# Patient Record
Sex: FEMALE | Race: WHITE | NOT HISPANIC OR LATINO | Employment: OTHER | URBAN - NONMETROPOLITAN AREA
[De-identification: names, ages, dates, MRNs, and addresses within clinical notes are randomized per-mention and may not be internally consistent; named-entity substitution may affect disease eponyms.]

---

## 2017-06-21 ENCOUNTER — TELEPHONE (OUTPATIENT)
Dept: CARDIOLOGY | Facility: CLINIC | Age: 72
End: 2017-06-21

## 2017-06-21 ENCOUNTER — OFFICE VISIT (OUTPATIENT)
Dept: CARDIOLOGY | Facility: CLINIC | Age: 72
End: 2017-06-21

## 2017-06-21 VITALS
WEIGHT: 105 LBS | HEART RATE: 64 BPM | DIASTOLIC BLOOD PRESSURE: 98 MMHG | BODY MASS INDEX: 19.32 KG/M2 | HEIGHT: 62 IN | SYSTOLIC BLOOD PRESSURE: 224 MMHG

## 2017-06-21 DIAGNOSIS — E03.9 ACQUIRED HYPOTHYROIDISM: ICD-10-CM

## 2017-06-21 DIAGNOSIS — I10 ESSENTIAL HYPERTENSION: Primary | ICD-10-CM

## 2017-06-21 DIAGNOSIS — I34.0 NON-RHEUMATIC MITRAL REGURGITATION: ICD-10-CM

## 2017-06-21 DIAGNOSIS — R53.82 CHRONIC FATIGUE: ICD-10-CM

## 2017-06-21 DIAGNOSIS — R01.1 MURMUR, CARDIAC: ICD-10-CM

## 2017-06-21 PROCEDURE — 99213 OFFICE O/P EST LOW 20 MIN: CPT | Performed by: INTERNAL MEDICINE

## 2017-06-21 RX ORDER — LEVOTHYROXINE SODIUM 0.07 MG/1
75 TABLET ORAL DAILY
COMMUNITY
End: 2020-09-30 | Stop reason: DRUGHIGH

## 2017-06-21 RX ORDER — HYDROCHLOROTHIAZIDE 25 MG/1
25 TABLET ORAL DAILY
Qty: 30 TABLET | Refills: 11 | Status: SHIPPED | OUTPATIENT
Start: 2017-06-21 | End: 2018-06-27 | Stop reason: SDUPTHER

## 2017-06-21 NOTE — PROGRESS NOTES
Chief Complaint   Patient presents with   • Follow-up     BP had been doing really good but has had a sinus infection. Took a zpack and since has had a lot of stomach issues and now BP has been up again. Denies chest pain or palpitations. Asking for refills on HCTZ to [facility]. Labs per PCP recently. Didn't bring med list but went over verbally.    • Shortness of Breath     R/T allergies.    • Fatigue     Since she has taken zpack       CARDIAC COMPLAINTS  dyspnea        Subjective   Maryellen Fernando is a 72 y.o. female who came today for her follow up visit.  She has history of significant HTN, whose cardiac workup showed normal coronaries, moderate renal artery stenosis.  She is allergic to almost every medication except Tekturna.  She also takes low dose of HCTZ.  She came today, stating she has been having some sinus infection for which she has taken some Zpak.  After which her diverticulitis is acting up and her blood pressure started going tup again.  She has not been taking her diuretics regularly.              Cardiac History  Past Surgical History:   Procedure Laterality Date   • CARDIOVASCULAR STRESS TEST  02/20/2014    Stress-(Mod) 9Min, 71%THR. R/O Inferior Ischemia.   • CATH LAB PROCEDURE  03/20/2014    Cath-Normal Coronaries. (L) renal artery-60%.   • ECHO - CONVERTED  02/20/2014    Echo-EF 65%. Inferior WMA.   • HYSTERECTOMY         Current Outpatient Prescriptions   Medication Sig Dispense Refill   • aliskiren (TEKTURNA) 300 MG tablet Take 300 mg by mouth daily.     • hydrochlorothiazide (HYDRODIURIL) 25 MG tablet Take 1 tablet by mouth Daily. 30 tablet 11   • levothyroxine (SYNTHROID, LEVOTHROID) 75 MCG tablet Take 75 mcg by mouth Daily.     • LORazepam (ATIVAN) 0.5 MG tablet Take 0.5 mg by mouth As Needed for Anxiety.       No current facility-administered medications for this visit.        Allergies:  Albuterol; Avapro [irbesartan]; Beconase [beclomethasone]; Benicar [olmesartan]; Buspar  [buspirone]; Clarithromycin; Clonidine derivatives; Coreg [carvedilol]; Cozaar [losartan potassium]; Diflucan [fluconazole]; Diltiazem; Donnatal [pb-hyoscy-atropine-scopol er]; Edarbyclor [azilsartan-chlorthalidone]; Flagyl [metronidazole]; Lopid [gemfibrozil]; Minoxidil; Penicillins; Sulfa antibiotics; Toprol xl [metoprolol tartrate]; and Zestril [lisinopril]      Past Medical History:   Diagnosis Date   • Anxiety    • Esophageal reflux    • H/O: hysterectomy    • Hypercholesterolemia    • Hypertension    • Hypothyroidism    • Osteoporosis    • Scoliosis    • TB (tuberculosis)     Treated a year ago for TB       Social History     Social History   • Marital status:      Spouse name: N/A   • Number of children: N/A   • Years of education: N/A     Occupational History   • Not on file.     Social History Main Topics   • Smoking status: Former Smoker     Quit date: 12/14/1966   • Smokeless tobacco: Never Used      Comment: smoked for maybe 5 years as a teenager..    • Alcohol use No   • Drug use: No   • Sexual activity: Not on file     Other Topics Concern   • Not on file     Social History Narrative       Family History   Problem Relation Age of Onset   • Diabetes Mother    • Heart disease Mother    • Heart disease Father    • Diabetes Other      Diabetes   • Heart disease Other    • Stroke Other    • Other Other      Siblings: CABG; Cardiac stenting       Review of Systems   Constitution: Positive for decreased appetite and malaise/fatigue.   HENT: Positive for congestion and hoarse voice.    Eyes: Negative for blurred vision.   Cardiovascular: Positive for dyspnea on exertion. Negative for chest pain and irregular heartbeat.   Respiratory: Positive for shortness of breath. Negative for cough.    Endocrine: Negative for cold intolerance.   Hematologic/Lymphatic: Negative for adenopathy.   Skin: Negative for nail changes.   Musculoskeletal: Negative for arthritis.   Gastrointestinal: Positive for abdominal  "pain.   Genitourinary: Negative for bladder incontinence and incomplete emptying.   Neurological: Negative for focal weakness.   Psychiatric/Behavioral: Negative for altered mental status.   Allergic/Immunologic: Negative for environmental allergies.       Diabetes- No  Thyroid- normal    Objective     BP (!) 224/98  Pulse 64  Ht 61.5\" (156.2 cm)  Wt 105 lb (47.6 kg)  BMI 19.52 kg/m2    Physical Exam   Constitutional: She appears well-developed.   HENT:   Head: Normocephalic.   Eyes: Pupils are equal, round, and reactive to light.   Neck: Normal range of motion.   Cardiovascular: Normal rate.    Murmur heard.  Pulmonary/Chest: Effort normal.   Abdominal: Soft. Bowel sounds are normal.   Musculoskeletal: Normal range of motion.   Neurological: She is alert.   Skin: Skin is warm.   Psychiatric: She has a normal mood and affect.       Procedures          Assessment/Plan      HR is stable.  BP is significantly elevated.  I had a long talk with her about the blood pressure medications.  I advised her to take it everyday and if it continues to go up, then consider increasing the dose to 50 mg.  She is going to call us back with the blood pressure.  Overall her cardiac status appears to be stable.    Maryellen was seen today for follow-up, shortness of breath and fatigue.    Diagnoses and all orders for this visit:    Essential hypertension  -     hydrochlorothiazide (HYDRODIURIL) 25 MG tablet; Take 1 tablet by mouth Daily.    Acquired hypothyroidism    Non-rheumatic mitral regurgitation    Murmur, cardiac    Chronic fatigue                         Electronically signed by Alem Medina MD June 21, 2017 11:31 AM      "

## 2017-12-20 ENCOUNTER — OFFICE VISIT (OUTPATIENT)
Dept: CARDIOLOGY | Facility: CLINIC | Age: 72
End: 2017-12-20

## 2017-12-20 VITALS
DIASTOLIC BLOOD PRESSURE: 90 MMHG | HEIGHT: 61 IN | SYSTOLIC BLOOD PRESSURE: 180 MMHG | BODY MASS INDEX: 20.2 KG/M2 | WEIGHT: 107 LBS | HEART RATE: 64 BPM

## 2017-12-20 DIAGNOSIS — E03.9 ACQUIRED HYPOTHYROIDISM: ICD-10-CM

## 2017-12-20 DIAGNOSIS — R06.02 SHORTNESS OF BREATH: ICD-10-CM

## 2017-12-20 DIAGNOSIS — I10 ESSENTIAL HYPERTENSION: Primary | ICD-10-CM

## 2017-12-20 DIAGNOSIS — R53.82 CHRONIC FATIGUE: ICD-10-CM

## 2017-12-20 DIAGNOSIS — I34.0 NON-RHEUMATIC MITRAL REGURGITATION: ICD-10-CM

## 2017-12-20 DIAGNOSIS — R01.1 MURMUR, CARDIAC: ICD-10-CM

## 2017-12-20 PROCEDURE — 99213 OFFICE O/P EST LOW 20 MIN: CPT | Performed by: NURSE PRACTITIONER

## 2017-12-20 RX ORDER — RANITIDINE 150 MG/1
150 TABLET ORAL NIGHTLY
COMMUNITY
End: 2020-01-08 | Stop reason: ALTCHOICE

## 2017-12-20 NOTE — PROGRESS NOTES
"Chief Complaint   Patient presents with   • Follow-up     For cardiac management. She states B/P does fluctuate at times, she reports has been doing better, has increased HCTZ 25mg to 1/2 tablet and also taking Ativan 25mg 1/4 tablet daily. She states \"breathing better since starting HCTZ\". Last labs at [facility]. PCP writes refills on medication.       Cardiac Complaints  dyspnea      Subjective   Maryellen Fernando is a 72 y.o. female with significant HTN, hypothyroidism, and hyperlipidemia, whose cardiac workup showed normal coronaries and moderate renal artery stenosis.  She is allergic to almost every medication except Tekturna.  She also takes low dose of HCTZ. She returns today for follow up and denies any new concerns.  She does have shortness of breath with exertion but states this has improved with addition of HCTZ, as well as blood pressure.  She states labs are done with PCP and at [facility] in August.  She states labs except for cholesterol looked okay.  She has been intolerant to cholesterol lowering agents.  She is now only taking 1/4 of her ativan as it makes her feel tired/dizzy.  No refills are needed today as they are done with PCP.      Cardiac History  Past Surgical History:   Procedure Laterality Date   • CARDIOVASCULAR STRESS TEST  02/20/2014    Stress-(Mod) 9Min, 71%THR. R/O Inferior Ischemia.   • CATH LAB PROCEDURE  03/20/2014    Cath-Normal Coronaries. (L) renal artery-60%.   • ECHO - CONVERTED  02/20/2014    Echo-EF 65%. Inferior WMA.   • ECHO - CONVERTED  12/20/2016    EF 60%, mild to mod MR, diastolic dysfunction   • HYSTERECTOMY         Current Outpatient Prescriptions   Medication Sig Dispense Refill   • aliskiren (TEKTURNA) 300 MG tablet Take 300 mg by mouth daily.     • hydrochlorothiazide (HYDRODIURIL) 25 MG tablet Take 1 tablet by mouth Daily. (Patient taking differently: Take 12.5 mg by mouth Daily.) 30 tablet 11   • levothyroxine (SYNTHROID, LEVOTHROID) 75 MCG tablet Take 75 mcg " by mouth Daily.     • LORazepam (ATIVAN) 0.5 MG tablet 1/4 tablet daily     • raNITIdine (ZANTAC) 150 MG tablet Take 150 mg by mouth Every Night.       No current facility-administered medications for this visit.        Albuterol; Avapro [irbesartan]; Beconase [beclomethasone]; Benicar [olmesartan]; Buspar [buspirone]; Clarithromycin; Clonidine derivatives; Coreg [carvedilol]; Cozaar [losartan potassium]; Diflucan [fluconazole]; Diltiazem; Donnatal [pb-hyoscy-atropine-scopol er]; Edarbyclor [azilsartan-chlorthalidone]; Flagyl [metronidazole]; Lopid [gemfibrozil]; Minoxidil; Penicillins; Sulfa antibiotics; Toprol xl [metoprolol tartrate]; and Zestril [lisinopril]    Past Medical History:   Diagnosis Date   • Anxiety    • Esophageal reflux    • H/O: hysterectomy    • Hypercholesterolemia    • Hypertension    • Hypothyroidism    • Osteoporosis    • Scoliosis    • TB (tuberculosis)     Treated a year ago for TB       Social History     Social History   • Marital status:      Spouse name: N/A   • Number of children: N/A   • Years of education: N/A     Occupational History   • Not on file.     Social History Main Topics   • Smoking status: Former Smoker     Quit date: 12/14/1966   • Smokeless tobacco: Never Used      Comment: smoked for maybe 5 years as a teenager..    • Alcohol use No   • Drug use: No   • Sexual activity: Not on file     Other Topics Concern   • Not on file     Social History Narrative       Family History   Problem Relation Age of Onset   • Diabetes Mother    • Heart disease Mother    • Heart disease Father    • Diabetes Other      Diabetes   • Heart disease Other    • Stroke Other    • Other Other      Siblings: CABG; Cardiac stenting       Review of Systems   Constitution: Negative for weakness and malaise/fatigue.   Cardiovascular: Negative for chest pain, dyspnea on exertion, near-syncope, palpitations and syncope.   Respiratory: Positive for shortness of breath. Negative for wheezing.   "  Musculoskeletal: Negative for arthritis and back pain.   Gastrointestinal: Negative for anorexia, dysphagia and nausea.   Genitourinary: Negative for dysuria, hesitancy and nocturia.   Neurological: Negative for dizziness, headaches and light-headedness.   Psychiatric/Behavioral: Negative for altered mental status and depression.       DiabetesNo  Thyroidabnormal    Objective     /90 (BP Location: Left arm)  Pulse 64  Ht 156.2 cm (61.5\")  Wt 48.5 kg (107 lb)  BMI 19.89 kg/m2    Physical Exam   Constitutional: She is oriented to person, place, and time. She appears well-developed and well-nourished.   HENT:   Head: Normocephalic and atraumatic.   Eyes: EOM are normal. Pupils are equal, round, and reactive to light.   Neck: Normal range of motion. Neck supple.   Cardiovascular: Normal rate and regular rhythm.    Murmur heard.  Pulmonary/Chest: Effort normal and breath sounds normal.   Abdominal: Soft.   Musculoskeletal: Normal range of motion.   Neurological: She is alert and oriented to person, place, and time.   Skin: Skin is warm and dry.   Psychiatric: She has a normal mood and affect. Her behavior is normal.       Procedures    Assessment/Plan     HR is stable today.  BP is elevated at 180/90 but she reports it is from her nerves.  She states she does take ativan but has not had it today.  She does keep a log at home and states it has been okay at home.  She was advised if continues to be elevated to take an extra 1/2 tablet of her HCTZ.  No cardiac testing will be advised today as no new concerns are voiced and cardiac symptoms have improved.  Labs are done with your office, could we get next copy for our records?  No refills are needed as they are done with you.  Good cardiac diet and activity as tolerated advised.  Limited sodium intake advised.  6 month follow up encouraged or sooner if needed.      Problems Addressed this Visit        Cardiovascular and Mediastinum    Essential hypertension - " Primary    Mitral regurgitation    Murmur, cardiac       Endocrine    Acquired hypothyroidism       Other    Chronic fatigue      Other Visit Diagnoses     Shortness of breath                      Electronically signed by Roselyn Leo, EMIL December 20, 2017 5:45 PM

## 2018-06-22 NOTE — PROGRESS NOTES
"Chief Complaint   Patient presents with   • Follow-up     Cardiac management. She reports B/P doing good at home. [facility]   • Shortness of Breath     She states \"has some, due to scar tissue on lungs\".   • Palpitations     She states \"some, yet not often\".   • Med Refill     Needs refills on Hydrochlothiazide-30 day.       Cardiac Complaints  dyspnea and palpitations      Subjective   Maryellen Fernando is a 73 y.o. female with significant HTN, hypothyroidism, and hyperlipidemia, whose cardiac workup showed normal coronaries and moderate renal artery stenosis.  She is allergic to almost every medication except Tekturna.  She also takes low dose of HCTZ. At last visit, blood pressure remained elevated but she had not been taking her ativan for her nerves and reported that at home blood pressure well managed.  Patient encouraged to take an extra half of HCTZ if it remained elevated at home.  She returns today for follow up and states at home  Blood pressures have been well controlled.  She does continue to admit to shortness of breath but states no worse than prior and thinks it may be lung tissue scarring.  She also has some palpitations but admits they are rare in nature.  Refills of HCTZ needed for 30 day supply.  [facility] and were last checked in 2017 but will be done again soon.      Cardiac History  Past Surgical History:   Procedure Laterality Date   • CARDIOVASCULAR STRESS TEST  02/20/2014    Stress-(Mod) 9Min, 71%THR. R/O Inferior Ischemia.   • CATH LAB PROCEDURE  03/20/2014    Cath-Normal Coronaries. (L) renal artery-60%.   • ECHO - CONVERTED  02/20/2014    Echo-EF 65%. Inferior WMA.   • ECHO - CONVERTED  12/20/2016    EF 60%, mild to mod MR, diastolic dysfunction   • HYSTERECTOMY         Current Outpatient Prescriptions   Medication Sig Dispense Refill   • aliskiren (TEKTURNA) 300 MG tablet Take 300 mg by mouth daily.     • hydrochlorothiazide (HYDRODIURIL) 25 " MG tablet Take 0.5 tablets by mouth Daily As Needed (daily as needed for HTN). 30 tablet 11   • levothyroxine (SYNTHROID, LEVOTHROID) 75 MCG tablet Take 75 mcg by mouth Daily.     • LORazepam (ATIVAN) 0.5 MG tablet 1/4 tablet daily prn     • raNITIdine (ZANTAC) 150 MG tablet Take 150 mg by mouth Every Night.       No current facility-administered medications for this visit.        Albuterol; Avapro [irbesartan]; Beconase [beclomethasone]; Benicar [olmesartan]; Buspar [buspirone]; Clonidine derivatives; Coreg [carvedilol]; Cozaar [losartan potassium]; Diltiazem; Edarbyclor [azilsartan-chlorthalidone]; Flagyl [metronidazole]; Lopid [gemfibrozil]; Minoxidil; Sulfa antibiotics; Toprol xl [metoprolol tartrate]; Zestril [lisinopril]; Clarithromycin; Donnatal [pb-hyoscy-atropine-scopol er]; and Penicillins    Past Medical History:   Diagnosis Date   • Anxiety    • Esophageal reflux    • H/O: hysterectomy    • Hypercholesterolemia    • Hypertension    • Hypothyroidism    • Osteoporosis    • Scoliosis    • TB (tuberculosis)     Treated a year ago for TB       Social History     Social History   • Marital status:      Spouse name: N/A   • Number of children: N/A   • Years of education: N/A     Occupational History   • Not on file.     Social History Main Topics   • Smoking status: Former Smoker     Quit date: 12/14/1966   • Smokeless tobacco: Never Used      Comment: smoked for maybe 5 years as a teenager..    • Alcohol use No   • Drug use: No   • Sexual activity: Not on file     Other Topics Concern   • Not on file     Social History Narrative   • No narrative on file       Family History   Problem Relation Age of Onset   • Diabetes Mother    • Heart disease Mother    • Heart disease Father    • Diabetes Other         Diabetes   • Heart disease Other    • Stroke Other    • Other Other         Siblings: CABG; Cardiac stenting       Review of Systems   Constitution: Negative for weakness and malaise/fatigue.  "  Cardiovascular: Positive for dyspnea on exertion and palpitations. Negative for chest pain, irregular heartbeat, near-syncope, orthopnea and syncope.   Respiratory: Negative for shortness of breath and wheezing.    Musculoskeletal: Negative for back pain, joint pain and neck pain.   Gastrointestinal: Negative for anorexia, heartburn, nausea and vomiting.   Genitourinary: Negative for dysuria, hematuria, hesitancy and nocturia.   Neurological: Negative for dizziness, light-headedness and numbness.   Psychiatric/Behavioral: Negative for depression and memory loss. The patient is nervous/anxious.            Objective     /90 (BP Location: Left arm)   Pulse 64   Ht 156.2 cm (61.5\")   Wt 49.9 kg (110 lb)   BMI 20.45 kg/m²     Physical Exam   Constitutional: She is oriented to person, place, and time. She appears well-developed and well-nourished.   HENT:   Head: Normocephalic and atraumatic.   Eyes: EOM are normal. Pupils are equal, round, and reactive to light.   Neck: Normal range of motion. Neck supple.   Cardiovascular: Normal rate and regular rhythm.    Murmur heard.  Pulmonary/Chest: Effort normal and breath sounds normal.   Abdominal: Soft.   Musculoskeletal: Normal range of motion.   Neurological: She is alert and oriented to person, place, and time.   Skin: Skin is warm and dry.   Psychiatric: She has a normal mood and affect. Her behavior is normal.       Procedures    Assessment/Plan     HR is stable today.  HTN appears to be much better managed than prior, and patient reports at home it has been normal.  No adjustment to current advised. Refills of HCTZ sent per request.  No new cardiac workup will be advised at present as no new or worsening cardiac concerns are voiced.  For anxiety, she continues to take ativan on as needed basis, but admits she is managing much better than prior.  Labs she states will be done again at [facility] in August. Patient reports she will try and get records for our " review.  BMI stable at 20, continued cardiac diet with limited sodium advised.  6 month follow up scheduled or sooner if needed.      Problems Addressed this Visit        Cardiovascular and Mediastinum    Essential hypertension    Relevant Medications    hydrochlorothiazide (HYDRODIURIL) 25 MG tablet      Other Visit Diagnoses     Anxiety    -  Primary    Shortness of breath        Palpitations              Patient's Body mass index is 20.45 kg/m². BMI is within normal parameters. No follow-up required.            Electronically signed by EMIL White June 27, 2018 2:56 PM

## 2018-06-27 ENCOUNTER — OFFICE VISIT (OUTPATIENT)
Dept: CARDIOLOGY | Facility: CLINIC | Age: 73
End: 2018-06-27

## 2018-06-27 VITALS
DIASTOLIC BLOOD PRESSURE: 90 MMHG | SYSTOLIC BLOOD PRESSURE: 130 MMHG | WEIGHT: 110 LBS | HEART RATE: 64 BPM | BODY MASS INDEX: 20.77 KG/M2 | HEIGHT: 61 IN

## 2018-06-27 DIAGNOSIS — I10 ESSENTIAL HYPERTENSION: ICD-10-CM

## 2018-06-27 DIAGNOSIS — R00.2 PALPITATIONS: ICD-10-CM

## 2018-06-27 DIAGNOSIS — E03.9 ACQUIRED HYPOTHYROIDISM: ICD-10-CM

## 2018-06-27 DIAGNOSIS — R06.02 SHORTNESS OF BREATH: ICD-10-CM

## 2018-06-27 DIAGNOSIS — F41.9 ANXIETY: Primary | ICD-10-CM

## 2018-06-27 PROCEDURE — 99213 OFFICE O/P EST LOW 20 MIN: CPT | Performed by: NURSE PRACTITIONER

## 2018-06-27 RX ORDER — HYDROCHLOROTHIAZIDE 25 MG/1
12.5 TABLET ORAL DAILY PRN
Qty: 30 TABLET | Refills: 11 | Status: SHIPPED | OUTPATIENT
Start: 2018-06-27 | End: 2021-03-22 | Stop reason: SDUPTHER

## 2019-01-02 ENCOUNTER — OFFICE VISIT (OUTPATIENT)
Dept: CARDIOLOGY | Facility: CLINIC | Age: 74
End: 2019-01-02

## 2019-01-02 VITALS
WEIGHT: 108 LBS | DIASTOLIC BLOOD PRESSURE: 78 MMHG | BODY MASS INDEX: 20.39 KG/M2 | HEART RATE: 64 BPM | SYSTOLIC BLOOD PRESSURE: 142 MMHG | HEIGHT: 61 IN

## 2019-01-02 DIAGNOSIS — I34.0 NON-RHEUMATIC MITRAL REGURGITATION: ICD-10-CM

## 2019-01-02 DIAGNOSIS — I10 ESSENTIAL HYPERTENSION: Primary | ICD-10-CM

## 2019-01-02 DIAGNOSIS — E03.9 ACQUIRED HYPOTHYROIDISM: ICD-10-CM

## 2019-01-02 DIAGNOSIS — R01.1 MURMUR, CARDIAC: ICD-10-CM

## 2019-01-02 PROCEDURE — 99213 OFFICE O/P EST LOW 20 MIN: CPT | Performed by: NURSE PRACTITIONER

## 2019-01-02 NOTE — PROGRESS NOTES
Chief Complaint   Patient presents with   • Follow-up     Cardiac management. She reports B/P has been stable at home, yet she forgot her medication yesterday. Last labs at [facility]. Patient verbalized medication list.       Charles Fernando is a 73 y.o. female with significant HTN, hypothyroidism, and hyperlipidemia whose cardiac workup showed normal coronaries and moderate renal artery stenosis.  She is allergic to almost every medication except Tekturna.  She also takes low dose of HCTZ and was advised to take an extra half tablet if bp elevated. Historically she has had mild SOB and rare palpitations but no chest pain. She came today for follow up. She feels well. Denies chest pain, shortness of breath, no edema. She uses HCTZ only as needed and not often.   She has noticed ativan helps her blood pressure better than anything. 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 Labs were done at recent [facility] and monitored by Dr. Tony. Refills per Dr. Tony.    HPI         Cardiac History:    Past Surgical History:   Procedure Laterality Date   • CARDIAC CATHETERIZATION  03/20/2014    Cath-Normal Coronaries. (L) renal artery-60%.   • CARDIOVASCULAR STRESS TEST  02/20/2014    Stress-(Mod) 9Min, 71%THR. R/O Inferior Ischemia.   • ECHO - CONVERTED  02/20/2014    Echo-EF 65%. Inferior WMA.   • ECHO - CONVERTED  12/20/2016    EF 60%, mild to mod MR, diastolic dysfunction       Current Outpatient Medications   Medication Sig Dispense Refill   • aliskiren (TEKTURNA) 300 MG tablet Take 300 mg by mouth daily.     • hydrochlorothiazide (HYDRODIURIL) 25 MG tablet Take 0.5 tablets by mouth Daily As Needed (daily as needed for HTN). 30 tablet 11   • levothyroxine (SYNTHROID, LEVOTHROID) 75 MCG tablet Take 75 mcg by mouth Daily.     • LORazepam (ATIVAN) 0.5 MG tablet 1/4 tablet daily prn     • raNITIdine (ZANTAC) 150 MG tablet Take 150 mg by mouth Every Night.       No  current facility-administered medications for this visit.        Albuterol; Avapro [irbesartan]; Beconase [beclomethasone]; Benicar [olmesartan]; Buspar [buspirone]; Clonidine derivatives; Coreg [carvedilol]; Cozaar [losartan potassium]; Diltiazem; Edarbyclor [azilsartan-chlorthalidone]; Flagyl [metronidazole]; Lopid [gemfibrozil]; Minoxidil; Sulfa antibiotics; Toprol xl [metoprolol tartrate]; Zestril [lisinopril]; Clarithromycin; Donnatal [pb-hyoscy-atropine-scopol er]; and Penicillins    Past Medical History:   Diagnosis Date   • Anxiety    • Esophageal reflux    • H/O: hysterectomy    • Hypercholesterolemia    • Hypertension    • Hypothyroidism    • Osteoporosis    • Scoliosis    • TB (tuberculosis)     Treated a year ago for TB       Social History     Socioeconomic History   • Marital status:      Spouse name: Not on file   • Number of children: Not on file   • Years of education: Not on file   • Highest education level: Not on file   Social Needs   • Financial resource strain: Not on file   • Food insecurity - worry: Not on file   • Food insecurity - inability: Not on file   • Transportation needs - medical: Not on file   • Transportation needs - non-medical: Not on file   Occupational History   • Not on file   Tobacco Use   • Smoking status: Former Smoker     Last attempt to quit: 1966     Years since quittin.0   • Smokeless tobacco: Never Used   • Tobacco comment: smoked for maybe 5 years as a teenager..    Substance and Sexual Activity   • Alcohol use: No   • Drug use: No   • Sexual activity: Not on file   Other Topics Concern   • Not on file   Social History Narrative   • Not on file       Family History   Problem Relation Age of Onset   • Diabetes Mother    • Heart disease Mother    • Heart disease Father    • Diabetes Other         Diabetes   • Heart disease Other    • Stroke Other    • Other Other         Siblings: CABG; Cardiac stenting       Review of Systems   Constitution: Positive  "for weight loss (down 2 lb ). Negative for decreased appetite, weakness and malaise/fatigue.   HENT: Negative.    Eyes: Negative.    Cardiovascular: Positive for palpitations (rarely). Negative for chest pain, dyspnea on exertion, leg swelling, orthopnea and syncope.   Respiratory: Negative for cough and shortness of breath.    Endocrine: Negative.    Hematologic/Lymphatic: Negative.    Skin: Negative.    Musculoskeletal: Negative for arthritis and myalgias.   Gastrointestinal: Negative for abdominal pain and melena.   Genitourinary: Negative for dysuria and hematuria.   Neurological: Negative for dizziness.   Psychiatric/Behavioral: Negative for altered mental status and depression.   Allergic/Immunologic: Negative.         Diabetes- No  Thyroid-normal    Objective     /78 (BP Location: Left arm)   Pulse 64   Ht 156.2 cm (61.5\")   Wt 49 kg (108 lb)   BMI 20.08 kg/m²     Physical Exam   Constitutional: She is oriented to person, place, and time. She appears well-developed and well-nourished. No distress.   HENT:   Head: Normocephalic and atraumatic.   Eyes: Pupils are equal, round, and reactive to light.   Neck: Normal range of motion.   Cardiovascular: Normal rate, regular rhythm and intact distal pulses.   Murmur heard.   Systolic murmur is present with a grade of 2/6 at the apex.  Pulmonary/Chest: Effort normal and breath sounds normal. No respiratory distress. She has no wheezes. She has no rales.   Abdominal: Soft. Bowel sounds are normal. She exhibits no distension. There is no tenderness.   Musculoskeletal: Normal range of motion. She exhibits no edema.   Neurological: She is alert and oriented to person, place, and time.   Skin: Skin is warm and dry. She is not diaphoretic.   Psychiatric: She has a normal mood and affect.   Nursing note and vitals reviewed.     Procedures          Assessment/Plan    Heart rate stable. Blood pressure upper limit of normal. She did forget her medicine yesterday. No " changes made today. Continue Tekturna. She uses HCTZ as needed. She has no symptoms, therefore no testing ordered today. She is advised to limit sodium to 1500 mg daily. She will remain active with regular walking. She works four hours daily and was encouraged to continue as long as she tolerates. Labs are followed by Dr. Tony and checked annually at the [facility]. Refills done by Dr. Tony as well. She appears stable. We will see her back in six months or sooner if needed.     Maryellen was seen today for follow-up.    Diagnoses and all orders for this visit:    Essential hypertension    Non-rheumatic mitral regurgitation    Murmur, cardiac    Acquired hypothyroidism        Patient's Body mass index is 20.08 kg/m². BMI is above normal parameters. Recommendations include: nutrition counseling.                    Electronically signed by EMIL Granados,  January 2, 2019 10:15 AM

## 2019-07-03 ENCOUNTER — OFFICE VISIT (OUTPATIENT)
Dept: CARDIOLOGY | Facility: CLINIC | Age: 74
End: 2019-07-03

## 2019-07-03 VITALS
BODY MASS INDEX: 20.01 KG/M2 | WEIGHT: 106 LBS | HEART RATE: 60 BPM | HEIGHT: 61 IN | DIASTOLIC BLOOD PRESSURE: 90 MMHG | SYSTOLIC BLOOD PRESSURE: 140 MMHG

## 2019-07-03 DIAGNOSIS — I34.0 NON-RHEUMATIC MITRAL REGURGITATION: ICD-10-CM

## 2019-07-03 DIAGNOSIS — R06.02 SHORTNESS OF BREATH: ICD-10-CM

## 2019-07-03 DIAGNOSIS — I10 ESSENTIAL HYPERTENSION: ICD-10-CM

## 2019-07-03 DIAGNOSIS — E03.9 ACQUIRED HYPOTHYROIDISM: ICD-10-CM

## 2019-07-03 DIAGNOSIS — R01.1 HEART MURMUR: Primary | ICD-10-CM

## 2019-07-03 DIAGNOSIS — F41.9 ANXIETY: ICD-10-CM

## 2019-07-03 PROCEDURE — 99213 OFFICE O/P EST LOW 20 MIN: CPT | Performed by: NURSE PRACTITIONER

## 2019-07-09 PROCEDURE — 93306 TTE W/DOPPLER COMPLETE: CPT | Performed by: INTERNAL MEDICINE

## 2019-07-15 ENCOUNTER — OUTSIDE FACILITY SERVICE (OUTPATIENT)
Dept: CARDIOLOGY | Facility: CLINIC | Age: 74
End: 2019-07-15

## 2020-01-08 ENCOUNTER — OFFICE VISIT (OUTPATIENT)
Dept: CARDIOLOGY | Facility: CLINIC | Age: 75
End: 2020-01-08

## 2020-01-08 VITALS
BODY MASS INDEX: 19.94 KG/M2 | SYSTOLIC BLOOD PRESSURE: 160 MMHG | HEIGHT: 61 IN | DIASTOLIC BLOOD PRESSURE: 90 MMHG | WEIGHT: 105.6 LBS | HEART RATE: 60 BPM

## 2020-01-08 DIAGNOSIS — R01.1 MURMUR, CARDIAC: ICD-10-CM

## 2020-01-08 DIAGNOSIS — I10 ESSENTIAL HYPERTENSION: Primary | ICD-10-CM

## 2020-01-08 DIAGNOSIS — I34.0 NONRHEUMATIC MITRAL VALVE REGURGITATION: ICD-10-CM

## 2020-01-08 DIAGNOSIS — I27.20 PULMONARY HYPERTENSION (HCC): ICD-10-CM

## 2020-01-08 PROCEDURE — 99213 OFFICE O/P EST LOW 20 MIN: CPT | Performed by: NURSE PRACTITIONER

## 2020-01-08 NOTE — PROGRESS NOTES
Chief Complaint   Patient presents with   • Follow-up     Cardiac management.   • Lab     Last labs [facility] in August. PCP writes refills on medication.   • Shortness of Breath     Has noticed more since last visit with exertion. She reports having problems with lungs in past.    • Rapid Heart Rate     Only had while taking Prednisone, has had no further problems since stopping Prednisone.   • Blood pressure     Has been elevated with sinus congestion and with anxiety.       Charles Fernando is a 74 y.o. female with significant HTN, hypothyroidism, anxiety, mitral regurgitation and hyperlipidemia whose cardiac workup showed normal coronaries and moderate renal artery stenosis. Most recent echo in 2016 showed normal LV function and mod MR.  She is allergic to almost every medication except Tekturna.  She also takes low dose of HCTZ as needed for elevated bp. She is quite anxious and patient reports ativan brings her blood pressure down as well. Echo repeated July 2019 which showed normal EF and MR and AI remained mild. PA pressure mildly elevated. She came today for follow up. She denies cardiac symptoms. No CP, SOB, palpitations. She monitors bp at home which is mostly well controlled with occasional elevated reading. Labs followed by Dr. Tony. She has been unable to tolerate any statin.    HPI         Cardiac History:    Past Surgical History:   Procedure Laterality Date   • CARDIAC CATHETERIZATION  03/20/2014    Cath-Normal Coronaries. (L) renal artery-60%.   • CARDIOVASCULAR STRESS TEST  02/20/2014    Stress-(Mod) 9Min, 71%THR. R/O Inferior Ischemia.   • ECHO - CONVERTED  02/20/2014    Echo-EF 65%. Inferior WMA.   • ECHO - CONVERTED  12/20/2016    EF 60%, mild to mod MR, diastolic dysfunction   • ECHO - CONVERTED  07/15/2019    @ [facility]. EF 65%. Mild MR & AI. RVSP- 35 mmHg     Current Outpatient Medications   Medication Sig Dispense Refill   • aliskiren (TEKTURNA) 300 MG tablet Take 300 mg by  mouth daily.     • hydrochlorothiazide (HYDRODIURIL) 25 MG tablet Take 0.5 tablets by mouth Daily As Needed (daily as needed for HTN). 30 tablet 11   • levothyroxine (SYNTHROID, LEVOTHROID) 75 MCG tablet Take 75 mcg by mouth Daily.     • LORazepam (ATIVAN) 0.5 MG tablet 1/4 tablet daily prn       No current facility-administered medications for this visit.      Albuterol; Avapro [irbesartan]; Beconase [beclomethasone]; Benicar [olmesartan]; Buspar [buspirone]; Clonidine derivatives; Coreg [carvedilol]; Cozaar [losartan potassium]; Diltiazem; Edarbyclor [azilsartan-chlorthalidone]; Flagyl [metronidazole]; Lopid [gemfibrozil]; Minoxidil; Sulfa antibiotics; Toprol xl [metoprolol tartrate]; Zestril [lisinopril]; Clarithromycin; Donnatal [pb-hyoscy-atropine-scopolamine]; and Penicillins    Past Medical History:   Diagnosis Date   • Anxiety    • Esophageal reflux    • H/O: hysterectomy    • Hypercholesterolemia    • Hypertension    • Hypothyroidism    • Osteoporosis    • Scoliosis    • TB (tuberculosis)     Treated a year ago for TB     Social History     Socioeconomic History   • Marital status:      Spouse name: Not on file   • Number of children: Not on file   • Years of education: Not on file   • Highest education level: Not on file   Tobacco Use   • Smoking status: Former Smoker     Last attempt to quit: 1966     Years since quittin.1   • Smokeless tobacco: Never Used   • Tobacco comment: smoked for maybe 5 years as a teenager..    Substance and Sexual Activity   • Alcohol use: No   • Drug use: No     Family History   Problem Relation Age of Onset   • Diabetes Mother    • Heart disease Mother    • Heart disease Father    • Diabetes Other         Diabetes   • Heart disease Other    • Stroke Other    • Other Other         Siblings: CABG; Cardiac stenting     Review of Systems   Constitution: Negative for decreased appetite, malaise/fatigue and weight loss.   HENT: Negative.    Eyes: Negative.   "  Cardiovascular: Negative for chest pain, dyspnea on exertion, leg swelling, palpitations and syncope.   Respiratory: Negative for cough and shortness of breath.    Endocrine: Negative.    Hematologic/Lymphatic: Negative.    Skin: Negative.    Musculoskeletal: Negative for falls and myalgias.   Gastrointestinal: Negative for abdominal pain and melena.   Genitourinary: Negative for hematuria.   Neurological: Negative for dizziness.   Psychiatric/Behavioral: Negative for altered mental status. The patient is nervous/anxious.    Allergic/Immunologic: Negative.       Diabetes- No  Thyroid-normal    Objective     /90 (BP Location: Left arm)   Pulse 60   Ht 156.2 cm (61.5\")   Wt 47.9 kg (105 lb 9.6 oz)   BMI 19.63 kg/m²     Physical Exam   Constitutional: She is oriented to person, place, and time. She appears well-developed and well-nourished.   HENT:   Head: Normocephalic.   Eyes: Pupils are equal, round, and reactive to light.   Neck: Normal range of motion.   Cardiovascular: Normal rate, regular rhythm and intact distal pulses.   Murmur heard.   Systolic murmur is present with a grade of 2/6 at the upper right sternal border and apex.  Pulmonary/Chest: Effort normal and breath sounds normal. No respiratory distress.   Abdominal: Soft. Bowel sounds are normal.   Musculoskeletal: Normal range of motion. She exhibits no edema.   Neurological: She is alert and oriented to person, place, and time.   Skin: Skin is warm and dry.   Psychiatric: She has a normal mood and affect.   Nursing note and vitals reviewed.     Procedures          Assessment/Plan    Maryellen was seen today for follow-up, lab, shortness of breath, rapid heart rate and blood pressure.    Diagnoses and all orders for this visit:    Essential hypertension    Nonrheumatic mitral valve regurgitation    Murmur, cardiac    Pulmonary hypertension (CMS/HCC)    1. HTN- essential vs renovascular- elevated today. Continue Tekturna 300 mg qd. She is advised " to take HCTZ daily at least for the next one week and monitor blood pressure. Limit sodium intake. Report if not improved. Med allergy list reviewed. I do not see amlodipine as allergy which may be an option of the blood pressure does not improve.     2. VHD- stable, Echo 7/2019- EF 65%, mild MR, mild AI, RVSP 35 mmHg. Continue to monitor. Need improved HTN control.    3. Hyperlipidemia- managed with diet as she is unable to tolerate statin therapy.     Cardiac reports reviewed with her. She denies any new symptoms. No testing ordered today. If she develops any chest tightness/discomfort, we may need to repeat her stress test. We will see her back in six months or sooner if needed.     Patient's Body mass index is 19.63 kg/m². BMI is within normal parameters. No follow-up required..               Electronically signed by MEIL Granados,  January 12, 2020 3:56 PM

## 2020-01-12 PROBLEM — I27.20 PULMONARY HYPERTENSION (HCC): Status: ACTIVE | Noted: 2020-01-12

## 2020-09-30 ENCOUNTER — OFFICE VISIT (OUTPATIENT)
Dept: CARDIOLOGY | Facility: CLINIC | Age: 75
End: 2020-09-30

## 2020-09-30 VITALS
HEIGHT: 61 IN | RESPIRATION RATE: 16 BRPM | HEART RATE: 52 BPM | DIASTOLIC BLOOD PRESSURE: 92 MMHG | TEMPERATURE: 98.4 F | WEIGHT: 106.6 LBS | SYSTOLIC BLOOD PRESSURE: 228 MMHG | BODY MASS INDEX: 20.12 KG/M2

## 2020-09-30 DIAGNOSIS — R00.1 BRADYCARDIA, SINUS: Primary | ICD-10-CM

## 2020-09-30 DIAGNOSIS — R01.1 MURMUR, CARDIAC: ICD-10-CM

## 2020-09-30 DIAGNOSIS — I10 ESSENTIAL HYPERTENSION: ICD-10-CM

## 2020-09-30 DIAGNOSIS — F41.9 ANXIETY: ICD-10-CM

## 2020-09-30 PROCEDURE — 99213 OFFICE O/P EST LOW 20 MIN: CPT | Performed by: NURSE PRACTITIONER

## 2020-09-30 PROCEDURE — 93000 ELECTROCARDIOGRAM COMPLETE: CPT | Performed by: NURSE PRACTITIONER

## 2020-09-30 RX ORDER — FAMOTIDINE 10 MG
10 TABLET ORAL 2 TIMES DAILY
COMMUNITY
End: 2021-03-22

## 2020-09-30 RX ORDER — LEVOTHYROXINE SODIUM 0.05 MG/1
50 TABLET ORAL DAILY
COMMUNITY
Start: 2020-09-16 | End: 2021-03-22

## 2020-09-30 RX ORDER — AZITHROMYCIN 250 MG/1
TABLET, FILM COATED ORAL
COMMUNITY
Start: 2020-09-25 | End: 2021-03-22

## 2020-09-30 RX ORDER — CLONIDINE HYDROCHLORIDE 0.1 MG/1
TABLET ORAL
Qty: 30 TABLET | Refills: 8 | Status: SHIPPED | OUTPATIENT
Start: 2020-09-30 | End: 2021-03-22 | Stop reason: SDUPTHER

## 2020-09-30 RX ORDER — AMLODIPINE BESYLATE 5 MG/1
5 TABLET ORAL DAILY
Qty: 30 TABLET | Refills: 11 | Status: SHIPPED | OUTPATIENT
Start: 2020-09-30 | End: 2021-03-22

## 2020-09-30 NOTE — PROGRESS NOTES
Chief Complaint   Patient presents with   • Follow-up     Cardiac Management   • Med Management     No refills. PCP provides refills   • Labs     Completed 8/2020 @ PCP's office. Will call and request.   • Hypertension     BP readings provided are before BP meds. Denies HA's, CP, chest pressure, flushed sensation, chnages in vision. Pt reports BP was elevated at PCP's office. BP meds taken at 0830 am today.       Cardiac Complaints  none      Subjective   Maryellen Fernando is a 75 y.o. female with significant HTN, hypothyroidism, anxiety, mitral regurgitation, and hyperlipidemia whose cardiac workup showed normal coronaries and moderate renal artery stenosis. Most recent echo in 2016 showed normal LV function and mod MR.  She is allergic to almost every medication except Tekturna.  She also takes low dose of HCTZ as needed for elevated bp. She is quite anxious and patient reports ativan brings her blood pressure down as well. Echo repeated July 2019 which showed normal EF and MR and AI remained mild. PA pressure mildly elevated.     Patient returns today for follow up and denies any new concerns. Chest pain, SOA, palpitations, dizziness, and syncope denied. TIA denied. Labs she admits are followed by PCP and were last checked August 2020, no current available. She does bring blood pressure log today, but all are before medication therapy.  She admits to very high readings at home and states often above 250 SBP, she denies taking her ativan for her nerves as she is nervous to do so.  No refills needed.        Cardiac History  Past Surgical History:   Procedure Laterality Date   • CARDIAC CATHETERIZATION  03/20/2014    Cath-Normal Coronaries. (L) renal artery-60%.   • CARDIOVASCULAR STRESS TEST  02/20/2014    Stress-(Mod) 9Min, 71%THR. R/O Inferior Ischemia.   • ECHO - CONVERTED  02/20/2014    Echo-EF 65%. Inferior WMA.   • ECHO - CONVERTED  12/20/2016    EF 60%, mild to mod MR, diastolic dysfunction   • ECHO - CONVERTED   07/15/2019    @ [facility].  65%. Mild MR & AI. RVSP- 35 mmHg   • HYSTERECTOMY         Current Outpatient Medications   Medication Sig Dispense Refill   • aliskiren (TEKTURNA) 300 MG tablet Take 300 mg by mouth daily.     • azithromycin (ZITHROMAX) 250 MG tablet TAKE 2 TABLETS BY MOUTH ON DAY 1, THEN TAKE 1 TABLET DAILY ON DAYS 2-5     • Desloratadine (CLARINEX PO) Take  by mouth Daily As Needed.     • famotidine (PEPCID) 10 MG tablet Take 10 mg by mouth 2 (Two) Times a Day.     • hydrochlorothiazide (HYDRODIURIL) 25 MG tablet Take 0.5 tablets by mouth Daily As Needed (daily as needed for HTN). 30 tablet 11   • levothyroxine (SYNTHROID, LEVOTHROID) 50 MCG tablet Take 50 mcg by mouth Daily.     • LORazepam (ATIVAN) 0.5 MG tablet 1/4 tablet daily prn     • amLODIPine (NORVASC) 5 MG tablet Take 1 tablet by mouth Daily. 30 tablet 11   • cloNIDine (Catapres) 0.1 MG tablet Please take if BP high after taking HCTZ, tekturna, and amlodipine 30 tablet 8     No current facility-administered medications for this visit.        Albuterol, Edarbyclor [azilsartan-chlorthalidone], Flagyl [metronidazole], Lopid [gemfibrozil], Minoxidil, Sulfa antibiotics, Avapro [irbesartan], Beconase [beclomethasone], Benicar [olmesartan], Buspar [buspirone], Clarithromycin, Clonidine derivatives, Coreg [carvedilol], Cozaar [losartan potassium], Diltiazem, Donnatal [pb-hyoscy-atropine-scopolamine], Penicillins, Toprol xl [metoprolol tartrate], and Zestril [lisinopril]    Past Medical History:   Diagnosis Date   • Anxiety    • Anxiety    • Esophageal reflux    • H/O: hysterectomy    • Hypercholesterolemia    • Hypertension    • Hypothyroidism    • Osteoporosis    • Scoliosis    • TB (tuberculosis)     Treated a year ago for TB       Social History     Socioeconomic History   • Marital status:      Spouse name: Not on file   • Number of children: Not on file   • Years of education: Not on file   • Highest education level: Not on file   Tobacco Use  "  • Smoking status: Former Smoker     Quit date: 1966     Years since quittin.8   • Smokeless tobacco: Never Used   • Tobacco comment: smoked for maybe 5 years as a teenager..    Substance and Sexual Activity   • Alcohol use: No   • Drug use: No       Family History   Problem Relation Age of Onset   • Diabetes Mother    • Heart disease Mother    • Heart disease Father    • Diabetes Other         Diabetes   • Heart disease Other    • Stroke Other    • Other Other         Siblings: CABG; Cardiac stenting       Review of Systems   Constitution: Negative for malaise/fatigue and night sweats.   Cardiovascular: Negative for chest pain, claudication, dyspnea on exertion, irregular heartbeat, leg swelling, orthopnea, palpitations and syncope.   Respiratory: Negative for cough, shortness of breath and wheezing.    Musculoskeletal: Positive for stiffness. Negative for back pain and joint pain.   Gastrointestinal: Negative for anorexia, nausea and vomiting.   Genitourinary: Negative for dysuria, hematuria, hesitancy and nocturia.   Neurological: Negative for dizziness, light-headedness and loss of balance.   Psychiatric/Behavioral: Negative for depression. The patient is nervous/anxious.            Objective     BP (!) 228/92 (BP Location: Left arm, Patient Position: Sitting, Cuff Size: Adult)   Pulse 52   Temp 98.4 °F (36.9 °C) (Temporal)   Resp 16   Ht 156.2 cm (61.5\")   Wt 48.4 kg (106 lb 9.6 oz)   Breastfeeding No   BMI 19.82 kg/m²     Constitutional:       Appearance: Healthy appearance.   Eyes:      Pupils: Pupils are equal, round, and reactive to light.   HENT:    Mouth/Throat:      Pharynx: Oropharynx is clear.   Neck:      Musculoskeletal: Normal range of motion and neck supple.   Pulmonary:      Effort: Pulmonary effort is normal.   Cardiovascular:      PMI at left midclavicular line. Bradycardia present. Regular rhythm.      Murmurs: There is a high frequency blowing holosystolic murmur at the " apex.   Abdominal:      Palpations: Abdomen is soft.   Musculoskeletal: Normal range of motion.   Skin:     General: Skin is warm and dry.   Neurological:      Mental Status: Oriented to person, place and time.           ECG 12 Lead    Date/Time: 9/30/2020 12:33 PM  Performed by: Roselyn Leo APRN  Authorized by: Roselyn Leo APRN   Comparison: compared with previous ECG   Rhythm: sinus bradycardia  BPM: 50    Clinical impression: normal ECG  Comments: Normal QT            Assessment/Plan     HTN:  Long discussion about management. She will be urged to begin norvasc therapy.  Patient extremely hesitant due to side effects. She is encouraged to begin with 1/2 tablet and increase if not controlled. She will continue with tekturna and HCTZ.  Clonidine added for prn use if SBP greater than 160 after meds.     Bradycardia:  Remains asymptomatic in regards. Pulse is slow but has been for years.    Anxiety:  She is NOT taking her ativan and we discussed once again taking medication for management since not taking is most likely driving up BP.      Hypothyroid:  Managed with synthroid therapy.  TSH with your office. Can we have for review?      Cardiac status:  Stable, no new concerns voiced. No workup advised. EKG done today for HTN and bradycardia shows sinus alberto with normal QT.     BMI stable at 19.82, continued cardiac diet with adequate protein and limited sodium advised.    No refills needed.    6 month follow up recommended or sooner if needed.             Problems Addressed this Visit        Cardiovascular and Mediastinum    Essential hypertension    Relevant Medications    amLODIPine (NORVASC) 5 MG tablet    cloNIDine (Catapres) 0.1 MG tablet    Murmur, cardiac      Other Visit Diagnoses     Bradycardia, sinus    -  Primary    Relevant Medications    amLODIPine (NORVASC) 5 MG tablet    Other Relevant Orders    ECG 12 Lead    Anxiety          Diagnoses       Codes Comments    Bradycardia, sinus    -   Primary ICD-10-CM: R00.1  ICD-9-CM: 427.89     Essential hypertension     ICD-10-CM: I10  ICD-9-CM: 401.9     Murmur, cardiac     ICD-10-CM: R01.1  ICD-9-CM: 785.2     Anxiety     ICD-10-CM: F41.9  ICD-9-CM: 300.00           Patient's Body mass index is 19.82 kg/m². BMI is within normal parameters. No follow-up required..            Electronically signed by Roselyn Leo, EMIL September 30, 2020 16:52 EDT

## 2021-03-22 ENCOUNTER — OFFICE VISIT (OUTPATIENT)
Dept: CARDIOLOGY | Facility: CLINIC | Age: 76
End: 2021-03-22

## 2021-03-22 VITALS
DIASTOLIC BLOOD PRESSURE: 84 MMHG | WEIGHT: 103 LBS | HEART RATE: 68 BPM | TEMPERATURE: 96.9 F | SYSTOLIC BLOOD PRESSURE: 162 MMHG | HEIGHT: 61 IN | BODY MASS INDEX: 19.45 KG/M2

## 2021-03-22 DIAGNOSIS — I10 ESSENTIAL HYPERTENSION: ICD-10-CM

## 2021-03-22 DIAGNOSIS — E03.9 ACQUIRED HYPOTHYROIDISM: ICD-10-CM

## 2021-03-22 DIAGNOSIS — F41.9 ANXIETY: ICD-10-CM

## 2021-03-22 DIAGNOSIS — R53.83 OTHER FATIGUE: ICD-10-CM

## 2021-03-22 DIAGNOSIS — R00.2 PALPITATIONS: ICD-10-CM

## 2021-03-22 DIAGNOSIS — R01.1 MURMUR, CARDIAC: Primary | ICD-10-CM

## 2021-03-22 DIAGNOSIS — I27.20 PULMONARY HYPERTENSION (HCC): ICD-10-CM

## 2021-03-22 PROCEDURE — 99214 OFFICE O/P EST MOD 30 MIN: CPT | Performed by: NURSE PRACTITIONER

## 2021-03-22 RX ORDER — FAMOTIDINE 20 MG/1
20 TABLET, FILM COATED ORAL 2 TIMES DAILY
COMMUNITY
End: 2022-03-10

## 2021-03-22 RX ORDER — CLONIDINE HYDROCHLORIDE 0.1 MG/1
TABLET ORAL
Qty: 90 TABLET | Refills: 2 | Status: SHIPPED | OUTPATIENT
Start: 2021-03-22 | End: 2022-03-10 | Stop reason: HOSPADM

## 2021-03-22 RX ORDER — LEVOTHYROXINE SODIUM 0.07 MG/1
75 TABLET ORAL DAILY
COMMUNITY

## 2021-03-22 RX ORDER — HYDROCHLOROTHIAZIDE 25 MG/1
12.5 TABLET ORAL DAILY PRN
Qty: 90 TABLET | Refills: 2 | Status: SHIPPED | OUTPATIENT
Start: 2021-03-22 | End: 2022-12-08 | Stop reason: SDUPTHER

## 2021-03-22 RX ORDER — ALISKIREN 300 MG/1
300 TABLET, FILM COATED ORAL DAILY
Qty: 90 TABLET | Refills: 2 | Status: SHIPPED | OUTPATIENT
Start: 2021-03-22 | End: 2022-12-08 | Stop reason: SDUPTHER

## 2021-03-22 NOTE — PROGRESS NOTES
Chief Complaint   Patient presents with   • Follow-up     Cardiac management   • Lab     Last labs 3/2021 at [facility].   • Palpitations     Few episodes fluttering at the first of month, had epigastric pain went to [facility] ER. Had CT of Abd/pelvis, labs and EKG, attempting to obtain records.   • Fatigue     Has noticed more since having Covid 01/2021. She had loss weight to 98lb 2/2021.   • Norvasc     Stopped taking after 2 weeks due to ankles changing color and burning in ankles.    • Med Refill     Needs refills on cardiac medications-90 day       Cardiac Complaints  fatigue and palpitations      Subjective   Maryellen Fernando is a 76 y.o. female  with significant HTN, hypothyroidism, anxiety, mitral regurgitation, and hyperlipidemia whose cardiac workup showed normal coronaries and moderate renal artery stenosis. Most recent echo in 2016 showed normal LV function and mod MR.  She is allergic to almost every medication except Tekturna.  She also takes low dose of HCTZ as needed for elevated bp. She is quite anxious and patient reports ativan brings her blood pressure down as well. Echo repeated July 2019 which showed normal EF and MR and AI remained mild. PA pressure mildly elevated.      Patient returns today for follow up and denies any new concerns. Chest pain, SOA, palpitations, dizziness, and syncope denied. TIA denied.  At last visit, blood pressure was markedly increased, and she was advised to add back norvasc therapy. She had also reported holding her ativan as she was fearful to take it, but she was encouraged to take it as needed. She does report having some issues with her stomach hurting, and went to [facility] ER on 3/2021, we will obtain records, none available for review. She does admit to COVID in January 2021, and did lose weight at that time, she states it is coming back up.  She does report she is not taking norvasc as it made her legs hurt, but admits she has been back on her ativan at 1/2 tablet daily.  Refills  requested.  Labs done recently done at [facility].         Cardiac History  Past Surgical History:   Procedure Laterality Date   • CARDIAC CATHETERIZATION  03/20/2014    Cath-Normal Coronaries. (L) renal artery-60%.   • CARDIOVASCULAR STRESS TEST  02/20/2014    Stress-(Mod) 9Min, 71%THR. R/O Inferior Ischemia.   • ECHO - CONVERTED  02/20/2014    Echo-EF 65%. Inferior WMA.   • ECHO - CONVERTED  12/20/2016    EF 60%, mild to mod MR, diastolic dysfunction   • ECHO - CONVERTED  07/15/2019    @ [facility]. EF 65%. Mild MR & AI. RVSP- 35 mmHg   • HYSTERECTOMY         Current Outpatient Medications   Medication Sig Dispense Refill   • aliskiren (TEKTURNA) 300 MG tablet Take 1 tablet by mouth Daily. 90 tablet 2   • cloNIDine (Catapres) 0.1 MG tablet Please take if BP high after taking HCTZ, tekturna, and amlodipine 90 tablet 2   • Desloratadine (CLARINEX PO) Take  by mouth Daily As Needed.     • famotidine (PEPCID) 20 MG tablet Take 20 mg by mouth 2 (Two) Times a Day.     • hydroCHLOROthiazide (HYDRODIURIL) 25 MG tablet Take 0.5 tablets by mouth Daily As Needed (daily as needed for HTN). 90 tablet 2   • levothyroxine (SYNTHROID, LEVOTHROID) 75 MCG tablet Take 75 mcg by mouth Daily.     • LORazepam (ATIVAN) 0.5 MG tablet 1/2 tablet daily prn       No current facility-administered medications for this visit.       Albuterol, Edarbyclor [azilsartan-chlorthalidone], Flagyl [metronidazole], Lopid [gemfibrozil], Minoxidil, Sulfa antibiotics, Avapro [irbesartan], Beconase [beclomethasone], Benicar [olmesartan], Buspar [buspirone], Clarithromycin, Clonidine derivatives, Coreg [carvedilol], Cozaar [losartan potassium], Diltiazem, Donnatal [phenobarbital-belladonna alk], Penicillins, Toprol xl [metoprolol tartrate], and Zestril [lisinopril]    Past Medical History:   Diagnosis Date   • Anxiety    • Anxiety    • Esophageal reflux    • H/O: hysterectomy    • Hypercholesterolemia    • Hypertension    • Hypothyroidism    • Osteoporosis    • Scoliosis   "  • TB (tuberculosis)     Treated a year ago for TB       Social History     Socioeconomic History   • Marital status:      Spouse name: Not on file   • Number of children: Not on file   • Years of education: Not on file   • Highest education level: Not on file   Tobacco Use   • Smoking status: Former Smoker     Quit date: 1966     Years since quittin.3   • Smokeless tobacco: Never Used   • Tobacco comment: smoked for maybe 5 years as a teenager..    Vaping Use   • Vaping Use: Never used   Substance and Sexual Activity   • Alcohol use: No   • Drug use: No       Family History   Problem Relation Age of Onset   • Diabetes Mother    • Heart disease Mother    • Heart disease Father    • Diabetes Other         Diabetes   • Heart disease Other    • Stroke Other    • Other Other         Siblings: CABG; Cardiac stenting       Review of Systems   Constitutional: Positive for malaise/fatigue. Negative for night sweats.   Cardiovascular: Positive for palpitations. Negative for chest pain, claudication, dyspnea on exertion, irregular heartbeat, leg swelling, near-syncope, orthopnea and syncope.   Respiratory: Negative for cough, shortness of breath and wheezing.    Musculoskeletal: Positive for stiffness. Negative for back pain and joint pain.   Gastrointestinal: Negative for anorexia, heartburn, melena, nausea and vomiting.   Genitourinary: Negative for dysuria, hematuria, hesitancy and nocturia.   Neurological: Negative for dizziness, light-headedness and loss of balance.   Psychiatric/Behavioral: Negative for depression and memory loss. The patient is nervous/anxious.            Objective     /84 (BP Location: Left arm)   Pulse 68   Temp 96.9 °F (36.1 °C)   Ht 156.2 cm (61.5\")   Wt 46.7 kg (103 lb)   BMI 19.15 kg/m²     Constitutional:       Appearance: Healthy appearance. Not in distress.   Eyes:      Pupils: Pupils are equal, round, and reactive to light.   HENT:      Nose: Nose normal. "   Pulmonary:      Effort: Pulmonary effort is normal.      Breath sounds: Normal breath sounds.   Cardiovascular:      PMI at left midclavicular line. Normal rate. Regular rhythm.      Murmurs: There is a systolic murmur.   Abdominal:      Palpations: Abdomen is soft.   Musculoskeletal: Normal range of motion.      Cervical back: Normal range of motion and neck supple. Skin:     General: Skin is warm and dry.   Neurological:      Mental Status: Oriented to person, place and time.         Procedures    Assessment/Plan     HTN:  Blood pressure elevated but she states at home it is well controlled. She does not wish to add medication in regards.  She will be taking her ativan and states this has been beneficial. She was urged to increase to 1/2 tablet BID, instead of 1/2 tablet daily.  BP log encouraged.     Anxiety:  Now back on 1/2 tablet per day of ativan and tolerating well.     Hypothyroid:  Managed with synthroid therapy.  TSH with your office. Can we have for review?       Cardiac status:  Stable, no new concerns voiced. No workup advised.     Stomach Pain:  Reported but no worse than prior. Recently in ER, trying to obtain records?      BMI stable at 19.15, continued cardiac diet with adequate protein and limited sodium advised.  Protein supplementation urged.     No refills needed.     6 month follow up recommended or sooner if needed.          Problems Addressed this Visit        Cardiac and Vasculature    Essential hypertension    Relevant Medications    aliskiren (TEKTURNA) 300 MG tablet    cloNIDine (Catapres) 0.1 MG tablet    hydroCHLOROthiazide (HYDRODIURIL) 25 MG tablet    Murmur, cardiac - Primary       Endocrine and Metabolic    Acquired hypothyroidism    Relevant Medications    levothyroxine (SYNTHROID, LEVOTHROID) 75 MCG tablet       Pulmonary and Pneumonias    Pulmonary hypertension (CMS/HCC)      Other Visit Diagnoses     Palpitations        Anxiety        Other fatigue          Diagnoses        Codes Comments    Murmur, cardiac    -  Primary ICD-10-CM: R01.1  ICD-9-CM: 785.2     Essential hypertension     ICD-10-CM: I10  ICD-9-CM: 401.9     Palpitations     ICD-10-CM: R00.2  ICD-9-CM: 785.1     Anxiety     ICD-10-CM: F41.9  ICD-9-CM: 300.00     Acquired hypothyroidism     ICD-10-CM: E03.9  ICD-9-CM: 244.9     Other fatigue     ICD-10-CM: R53.83  ICD-9-CM: 780.79     Pulmonary hypertension (CMS/HCC)     ICD-10-CM: I27.20  ICD-9-CM: 416.8           Patient's Body mass index is 19.15 kg/m². BMI is within normal parameters. No follow-up required..              Electronically signed by Roselyn Leo, EMIL March 22, 2021 16:38 EDT

## 2021-09-28 ENCOUNTER — OFFICE VISIT (OUTPATIENT)
Dept: CARDIOLOGY | Facility: CLINIC | Age: 76
End: 2021-09-28

## 2021-09-28 VITALS
SYSTOLIC BLOOD PRESSURE: 134 MMHG | BODY MASS INDEX: 18.77 KG/M2 | TEMPERATURE: 97.3 F | DIASTOLIC BLOOD PRESSURE: 74 MMHG | HEIGHT: 62 IN | HEART RATE: 68 BPM | WEIGHT: 102 LBS

## 2021-09-28 DIAGNOSIS — I34.0 NONRHEUMATIC MITRAL VALVE REGURGITATION: ICD-10-CM

## 2021-09-28 DIAGNOSIS — R06.02 SHORTNESS OF BREATH: ICD-10-CM

## 2021-09-28 DIAGNOSIS — R10.9 STOMACH PAIN: ICD-10-CM

## 2021-09-28 DIAGNOSIS — F41.9 ANXIETY: ICD-10-CM

## 2021-09-28 DIAGNOSIS — R63.4 WEIGHT LOSS: ICD-10-CM

## 2021-09-28 DIAGNOSIS — R01.1 MURMUR, CARDIAC: ICD-10-CM

## 2021-09-28 DIAGNOSIS — I10 ESSENTIAL HYPERTENSION: Primary | ICD-10-CM

## 2021-09-28 DIAGNOSIS — E03.9 ACQUIRED HYPOTHYROIDISM: ICD-10-CM

## 2021-09-28 DIAGNOSIS — R63.0 ANOREXIA: ICD-10-CM

## 2021-09-28 PROCEDURE — 99213 OFFICE O/P EST LOW 20 MIN: CPT | Performed by: NURSE PRACTITIONER

## 2021-09-28 RX ORDER — OMEPRAZOLE 20 MG/1
20 CAPSULE, DELAYED RELEASE ORAL DAILY
COMMUNITY
End: 2022-03-10

## 2021-09-28 NOTE — PROGRESS NOTES
Chief Complaint   Patient presents with   • Follow-up     cardiac management, no current labs   • Shortness of Breath     noticed after eating a meal, mostly at night. Currently seeing GI.    • Med Refill     no request at this at time       Cardiac Complaints  dyspnea      Subjective   Maryellen Fernando is a 76 y.o. female with significant HTN, hypothyroidism, anxiety, mitral regurgitation, and hyperlipidemia whose cardiac workup showed normal coronaries and moderate renal artery stenosis. Most recent echo in 2016 showed normal LV function and mod MR.  She is allergic to almost every medication except Tekturna.  She also takes low dose of HCTZ as needed for elevated bp. She is quite anxious and patient reports ativan brings her blood pressure down as well. Echo repeated July 2019 which showed normal EF and MR and AI remained mild. PA pressure mildly elevated.      Patient returns today for follow up and denies any new concerns. SOA noted with eating too big of meals, she does report to bloating a great deal more, she reports more mucous and blood tinged bowel movements.  She reports since her COVID in January, she has had a great deal of stomach issues, she is to have upper/lower GI soon. Weight has steadily declined since COVID as her diet has not been good. No chest pain, palpitations, dizziness, or syncope reported. BP has been good at home, she states she is trying to take her ativan daily. Labs remain followed by PCP, none available. No refills needed.        Cardiac History  Past Surgical History:   Procedure Laterality Date   • CARDIAC CATHETERIZATION  03/20/2014    Cath-Normal Coronaries. (L) renal artery-60%.   • CARDIOVASCULAR STRESS TEST  02/20/2014    Stress-(Mod) 9Min, 71%THR. R/O Inferior Ischemia.   • ECHO - CONVERTED  02/20/2014    Echo-EF 65%. Inferior WMA.   • ECHO - CONVERTED  12/20/2016    EF 60%, mild to mod MR, diastolic dysfunction   • ECHO - CONVERTED  07/15/2019    @ [facility]. EF 65%. Mild MR & AI.  RVSP- 35 mmHg   • HYSTERECTOMY         Current Outpatient Medications   Medication Sig Dispense Refill   • aliskiren (TEKTURNA) 300 MG tablet Take 1 tablet by mouth Daily. 90 tablet 2   • cloNIDine (Catapres) 0.1 MG tablet Please take if BP high after taking HCTZ, tekturna, and amlodipine 90 tablet 2   • Desloratadine (CLARINEX PO) Take  by mouth Daily As Needed.     • famotidine (PEPCID) 20 MG tablet Take 20 mg by mouth 2 (Two) Times a Day.     • hydroCHLOROthiazide (HYDRODIURIL) 25 MG tablet Take 0.5 tablets by mouth Daily As Needed (daily as needed for HTN). 90 tablet 2   • levothyroxine (SYNTHROID, LEVOTHROID) 75 MCG tablet Take 75 mcg by mouth Daily.     • LORazepam (ATIVAN) 0.5 MG tablet 1/2 tablet daily prn     • omeprazole (priLOSEC) 20 MG capsule Take 20 mg by mouth Daily.       No current facility-administered medications for this visit.       Albuterol, Edarbyclor [azilsartan-chlorthalidone], Flagyl [metronidazole], Lopid [gemfibrozil], Minoxidil, Sulfa antibiotics, Avapro [irbesartan], Beconase [beclomethasone], Benicar [olmesartan], Buspar [buspirone], Clarithromycin, Clonidine derivatives, Coreg [carvedilol], Cozaar [losartan potassium], Diltiazem, Donnatal [phenobarbital-belladonna alk], Penicillins, Toprol xl [metoprolol tartrate], and Zestril [lisinopril]    Past Medical History:   Diagnosis Date   • Anxiety    • Anxiety    • Esophageal reflux    • H/O: hysterectomy    • Hypercholesterolemia    • Hypertension    • Hypothyroidism    • Osteoporosis    • Scoliosis    • TB (tuberculosis)     Treated a year ago for TB       Social History     Socioeconomic History   • Marital status:      Spouse name: Not on file   • Number of children: Not on file   • Years of education: Not on file   • Highest education level: Not on file   Tobacco Use   • Smoking status: Former Smoker     Quit date: 1966     Years since quittin.8   • Smokeless tobacco: Never Used   • Tobacco comment: smoked for maybe  "5 years as a teenager..    Vaping Use   • Vaping Use: Never used   Substance and Sexual Activity   • Alcohol use: No   • Drug use: No       Family History   Problem Relation Age of Onset   • Diabetes Mother    • Heart disease Mother    • Heart disease Father    • Diabetes Other         Diabetes   • Heart disease Other    • Stroke Other    • Other Other         Siblings: CABG; Cardiac stenting       Review of Systems   Constitutional: Negative for malaise/fatigue and night sweats.   Cardiovascular: Positive for dyspnea on exertion. Negative for chest pain, claudication, irregular heartbeat, leg swelling, near-syncope, orthopnea, palpitations and syncope.   Respiratory: Positive for shortness of breath. Negative for cough and wheezing.    Musculoskeletal: Positive for stiffness. Negative for back pain and joint pain.   Gastrointestinal: Positive for anorexia, change in bowel habit, heartburn and nausea.   Genitourinary: Negative for dysuria, hematuria, hesitancy and nocturia.   Neurological: Negative for dizziness, light-headedness and loss of balance.   Psychiatric/Behavioral: Negative for depression and memory loss. The patient is not nervous/anxious.            Objective     /74 (BP Location: Left arm, Patient Position: Sitting, Cuff Size: Adult)   Pulse 68   Temp 97.3 °F (36.3 °C)   Ht 157.5 cm (62\")   Wt 46.3 kg (102 lb)   BMI 18.66 kg/m²     Constitutional:       Appearance: Not in distress.   Eyes:      Pupils: Pupils are equal, round, and reactive to light.   HENT:      Nose: Nose normal.   Pulmonary:      Effort: Pulmonary effort is normal.      Breath sounds: Normal breath sounds.   Cardiovascular:      PMI at left midclavicular line. Normal rate. Regular rhythm.      Murmurs: There is a systolic murmur.   Abdominal:      Palpations: Abdomen is soft.   Musculoskeletal: Normal range of motion.      Cervical back: Normal range of motion and neck supple. Skin:     General: Skin is warm and dry. "   Neurological:      Mental Status: Oriented to person, place and time.         Procedures    Assessment/Plan     HTN:  Blood pressure better managed at present, she admits at home well managed. She continues use of ativan daily for anxiety management. Limited sodium advised. Same tekturna, catapres, and HCTZ advised.     Anxiety:  Now back on 1/2 tablet of ativan daily and has tolerated well.    Murmur:  Noted most recent echo showed valves stable. For now, no repeat echo advised as symptoms denied.     Hypothyroid:  Managed with synthroid therapy. TSH with your office, heart rate well managed,no arrhythmia noted. Can we have next for review from the fall?       Cardiac status:  Stable, no new concerns voiced. No workup advised.      Stomach Pain:  Reported as well as anorexia. She is soon to follow for upper and lower GI.       BMI stable at 18.66, continued cardiac diet with adequate protein and limited sodium advised.  Protein supplementation urged.     No refills needed.     6 month follow up recommended or sooner if needed.       Problems Addressed this Visit        Cardiac and Vasculature    Essential hypertension - Primary    Mitral regurgitation    Murmur, cardiac       Endocrine and Metabolic    Acquired hypothyroidism      Other Visit Diagnoses     Shortness of breath        Weight loss        Anxiety        Stomach pain        Anorexia          Diagnoses       Codes Comments    Essential hypertension    -  Primary ICD-10-CM: I10  ICD-9-CM: 401.9     Nonrheumatic mitral valve regurgitation     ICD-10-CM: I34.0  ICD-9-CM: 424.0     Murmur, cardiac     ICD-10-CM: R01.1  ICD-9-CM: 785.2     Shortness of breath     ICD-10-CM: R06.02  ICD-9-CM: 786.05     Acquired hypothyroidism     ICD-10-CM: E03.9  ICD-9-CM: 244.9     Weight loss     ICD-10-CM: R63.4  ICD-9-CM: 783.21     Anxiety     ICD-10-CM: F41.9  ICD-9-CM: 300.00     Stomach pain     ICD-10-CM: R10.9  ICD-9-CM: 536.8     Anorexia     ICD-10-CM:  R63.0  ICD-9-CM: 783.0           Patient's Body mass index is 18.66 kg/m². indicating that she is underweight. Good cardiac diet with adequate protein intake advised.           Electronically signed by EMIL White September 28, 2021 17:09 EDT

## 2022-03-10 ENCOUNTER — OFFICE VISIT (OUTPATIENT)
Dept: CARDIOLOGY | Facility: CLINIC | Age: 77
End: 2022-03-10

## 2022-03-10 VITALS
WEIGHT: 102.6 LBS | HEART RATE: 62 BPM | BODY MASS INDEX: 18.88 KG/M2 | DIASTOLIC BLOOD PRESSURE: 90 MMHG | HEIGHT: 62 IN | SYSTOLIC BLOOD PRESSURE: 180 MMHG

## 2022-03-10 DIAGNOSIS — R00.2 PALPITATIONS: ICD-10-CM

## 2022-03-10 DIAGNOSIS — I10 PRIMARY HYPERTENSION: Primary | ICD-10-CM

## 2022-03-10 DIAGNOSIS — F41.9 ANXIETY: ICD-10-CM

## 2022-03-10 DIAGNOSIS — I27.20 PULMONARY HYPERTENSION: ICD-10-CM

## 2022-03-10 DIAGNOSIS — R07.89 CHEST DISCOMFORT: ICD-10-CM

## 2022-03-10 DIAGNOSIS — R01.1 MURMUR, CARDIAC: ICD-10-CM

## 2022-03-10 DIAGNOSIS — R06.02 SHORTNESS OF BREATH: ICD-10-CM

## 2022-03-10 DIAGNOSIS — R53.83 OTHER FATIGUE: ICD-10-CM

## 2022-03-10 PROCEDURE — 99214 OFFICE O/P EST MOD 30 MIN: CPT | Performed by: NURSE PRACTITIONER

## 2022-03-10 RX ORDER — CLONIDINE HYDROCHLORIDE 0.1 MG/1
TABLET ORAL
Qty: 180 TABLET | Refills: 2 | Status: SHIPPED | OUTPATIENT
Start: 2022-03-10 | End: 2022-09-22 | Stop reason: SDUPTHER

## 2022-03-10 NOTE — PROGRESS NOTES
Chief Complaint   Patient presents with   • Follow-up     Pt is here for cardiac follow up.  She states she thinks she had COVID again about 2 months ago.  She states she has not felt well since.  She states she is still fatigued.  Pt states she had some CP and some palpitations.  She states she had been taking prilosec.  She states she quit taking it and has felt a little better.  She states her BP was high when she was at the dentist yesterday.  She states she had had more SOB, mainly when she lays down at night.  She denies dizziness.  Pt does not take ASA.   • Med Refill     Pt states that she still uses [facility] and prefers 90 day refills.   • Lab Work     Pt states her last labs were about 3-4 months ago.       Cardiac Complaints  chest pressure/discomfort and palpitations      Subjective   Maryellen Fernando is a 77 y.o. female with significant HTN, hypothyroidism, anxiety, mitral regurgitation, and hyperlipidemia whose cardiac workup showed normal coronaries and moderate renal artery stenosis. Most recent echo in 2016 showed normal LV function and mod MR.  She is allergic to almost every medication except Tekturna.  She also takes low dose of HCTZ as needed for elevated bp. She is quite anxious and patient reports ativan brings her blood pressure down as well. Echo repeated July 2019 which showed normal EF and MR and AI remained mild. PA pressure mildly elevated.     Patient comes today for follow up and continues to have fatigue, which has been chronic. She also has some CP and palpitations, which she describes as intermittent and sharp. She does have SOA, but reports mostly with lying flat. Patient does admit that the breathlessness has limited her activity level. She does not report any dizziness/syncopal symptoms. Of note, she thinks she had COVID again recently, was not tested, but just feels generally unwell. Labs with PCP, checked 3-4 months ago, no current available. Refills requested.            Cardiac History  Past Surgical History:   Procedure Laterality Date   • CARDIAC CATHETERIZATION  03/20/2014    Cath-Normal Coronaries. (L) renal artery-60%.   • CARDIOVASCULAR STRESS TEST  02/20/2014    Stress-(Mod) 9Min, 71%THR. R/O Inferior Ischemia.   • ECHO - CONVERTED  02/20/2014    Echo-EF 65%. Inferior WMA.   • ECHO - CONVERTED  12/20/2016    EF 60%, mild to mod MR, diastolic dysfunction   • ECHO - CONVERTED  07/15/2019    @ [facility]. EF 65%. Mild MR & AI. RVSP- 35 mmHg   • HYSTERECTOMY         Current Outpatient Medications   Medication Sig Dispense Refill   • aliskiren (TEKTURNA) 300 MG tablet Take 1 tablet by mouth Daily. 90 tablet 2   • hydroCHLOROthiazide (HYDRODIURIL) 25 MG tablet Take 0.5 tablets by mouth Daily As Needed (daily as needed for HTN). 90 tablet 2   • levothyroxine (SYNTHROID, LEVOTHROID) 75 MCG tablet Take 75 mcg by mouth Daily.     • LORazepam (ATIVAN) 0.5 MG tablet 1/2 tablet daily prn     • cloNIDine (Catapres) 0.1 MG tablet Take twice a day as needed for BP management after meds, SBP >160 DBP>90 180 tablet 2     No current facility-administered medications for this visit.       Albuterol, Edarbyclor [azilsartan-chlorthalidone], Flagyl [metronidazole], Lopid [gemfibrozil], Minoxidil, Sulfa antibiotics, Avapro [irbesartan], Beconase [beclomethasone], Benicar [olmesartan], Buspar [buspirone], Clarithromycin, Clonidine derivatives, Coreg [carvedilol], Cozaar [losartan potassium], Diltiazem, Donnatal [phenobarbital-belladonna alk], Penicillins, Toprol xl [metoprolol tartrate], and Zestril [lisinopril]    Past Medical History:   Diagnosis Date   • Anxiety    • Anxiety    • Esophageal reflux    • H/O: hysterectomy    • History of colonoscopy 12/2021   • History of COVID-19    • History of esophagogastroduodenoscopy (EGD) 12/2021   • Hypercholesterolemia    • Hypertension    • Hypothyroidism    • Osteoporosis    • Scoliosis    • TB (tuberculosis)     Treated a year ago for TB       Social  "History     Socioeconomic History   • Marital status:    Tobacco Use   • Smoking status: Former Smoker     Quit date: 1966     Years since quittin.2   • Smokeless tobacco: Never Used   • Tobacco comment: smoked for maybe 5 years as a teenager..    Vaping Use   • Vaping Use: Never used   Substance and Sexual Activity   • Alcohol use: No   • Drug use: No       Family History   Problem Relation Age of Onset   • Diabetes Mother    • Heart disease Mother    • Heart disease Father    • Diabetes Other         Diabetes   • Heart disease Other    • Stroke Other    • Other Other         Siblings: CABG; Cardiac stenting       Review of Systems   Constitutional: Positive for malaise/fatigue. Negative for night sweats.   Cardiovascular: Positive for chest pain, dyspnea on exertion and palpitations. Negative for claudication, cyanosis, irregular heartbeat, leg swelling, near-syncope and syncope.   Respiratory: Positive for shortness of breath. Negative for cough and wheezing.    Musculoskeletal: Positive for joint pain and stiffness. Negative for back pain.   Gastrointestinal: Negative for anorexia, heartburn, melena, nausea and vomiting.   Genitourinary: Negative for dysuria, hematuria, hesitancy and nocturia.   Neurological: Negative for dizziness, headaches and light-headedness.   Psychiatric/Behavioral: Negative for depression and memory loss. The patient is nervous/anxious.            Objective     /90 (BP Location: Left arm, Patient Position: Sitting)   Pulse 62   Ht 157.5 cm (62\")   Wt 46.5 kg (102 lb 9.6 oz)   BMI 18.77 kg/m²     Constitutional:       Appearance: Not in distress.   Eyes:      Pupils: Pupils are equal, round, and reactive to light.   HENT:      Nose: Nose normal.   Pulmonary:      Effort: Pulmonary effort is normal.      Breath sounds: Normal breath sounds.   Cardiovascular:      PMI at left midclavicular line. Normal rate. Regular rhythm.      Murmurs: There is a systolic " murmur.   Abdominal:      Palpations: Abdomen is soft.   Musculoskeletal: Normal range of motion.      Cervical back: Normal range of motion and neck supple. Skin:     General: Skin is warm and dry.   Neurological:      Mental Status: Alert and oriented to person, place and time.         Procedures    Assessment/Plan     HTN:  Blood pressure not well managed, she admits at home well managed, but has been elevated at the doctor's office. She continues use of ativan daily for anxiety management. Limited sodium advised. Same tekturna and HCTZ continued. She was strongly urged to continue use of clonidine for elevation. PRN dosing increased to BID.     Anxiety:  Now back on 1/2 tablet of ativan daily and has tolerated well.     Murmur/SOA/chest discomfort:  Echo will be advised to assess valvular concerns, LV dysfunction, WMA. More recommendations to follow. We discussed repeat stress testing to assess for any ischemia/LV dysfunction.  More recommendations to follow.      Hypothyroid:  Managed with synthroid therapy. TSH with your office, heart rate well managed,no arrhythmia noted. Can we have next for review from the fall?        BMI stable at 18.77, continued cardiac diet with adequate protein and limited sodium advised.  Protein supplementation urged.     No refills needed.     6 month follow up recommended or sooner if needed.         Problems Addressed this Visit        Cardiac and Vasculature    Murmur, cardiac       Pulmonary and Pneumonias    Pulmonary hypertension (HCC)      Other Visit Diagnoses     Primary hypertension    -  Primary    Relevant Medications    cloNIDine (Catapres) 0.1 MG tablet    Other Relevant Orders    Adult Transthoracic Echo Complete W/ Cont if Necessary Per Protocol    Shortness of breath        Relevant Orders    Adult Transthoracic Echo Complete W/ Cont if Necessary Per Protocol    Other fatigue        Relevant Orders    Adult Transthoracic Echo Complete W/ Cont if Necessary Per  Protocol    Chest discomfort        Palpitations        Anxiety          Diagnoses       Codes Comments    Primary hypertension    -  Primary ICD-10-CM: I10  ICD-9-CM: 401.9     Shortness of breath     ICD-10-CM: R06.02  ICD-9-CM: 786.05     Other fatigue     ICD-10-CM: R53.83  ICD-9-CM: 780.79     Murmur, cardiac     ICD-10-CM: R01.1  ICD-9-CM: 785.2     Chest discomfort     ICD-10-CM: R07.89  ICD-9-CM: 786.59     Palpitations     ICD-10-CM: R00.2  ICD-9-CM: 785.1     Pulmonary hypertension (HCC)     ICD-10-CM: I27.20  ICD-9-CM: 416.8     Anxiety     ICD-10-CM: F41.9  ICD-9-CM: 300.00           Patient's Body mass index is 18.77 kg/m². indicating that she is WNW. Continued cardiac diet advised.               Electronically signed by Roselyn Leo, EMIL March 11, 2022 11:10 EST

## 2022-04-13 ENCOUNTER — HOSPITAL ENCOUNTER (OUTPATIENT)
Dept: CARDIOLOGY | Facility: HOSPITAL | Age: 77
Discharge: HOME | End: 2022-04-13
Admitting: NURSE PRACTITIONER

## 2022-04-13 DIAGNOSIS — I10 PRIMARY HYPERTENSION: ICD-10-CM

## 2022-04-13 DIAGNOSIS — R53.83 OTHER FATIGUE: ICD-10-CM

## 2022-04-13 DIAGNOSIS — R06.02 SHORTNESS OF BREATH: ICD-10-CM

## 2022-04-13 LAB
BH CV ECHO MEAS - ACS: 1.69 CM
BH CV ECHO MEAS - AO MAX PG: 8.3 MMHG
BH CV ECHO MEAS - AO MEAN PG: 4.4 MMHG
BH CV ECHO MEAS - AO ROOT DIAM: 3 CM
BH CV ECHO MEAS - AO V2 MAX: 143.9 CM/SEC
BH CV ECHO MEAS - AO V2 VTI: 35.3 CM
BH CV ECHO MEAS - EDV(CUBED): 25.2 ML
BH CV ECHO MEAS - EDV(MOD-SP4): 44.3 ML
BH CV ECHO MEAS - EF(MOD-SP4): 70.2 %
BH CV ECHO MEAS - EF_3D-VOL: 50 %
BH CV ECHO MEAS - ESV(CUBED): 4.3 ML
BH CV ECHO MEAS - ESV(MOD-SP4): 13.2 ML
BH CV ECHO MEAS - FS: 44.7 %
BH CV ECHO MEAS - IVS/LVPW: 1.06 CM
BH CV ECHO MEAS - IVSD: 1.33 CM
BH CV ECHO MEAS - LA DIMENSION: 3.4 CM
BH CV ECHO MEAS - LAT PEAK E' VEL: 9 CM/SEC
BH CV ECHO MEAS - LV DIASTOLIC VOL/BSA (35-75): 30.9 CM2
BH CV ECHO MEAS - LV MASS(C)D: 119.6 GRAMS
BH CV ECHO MEAS - LV SYSTOLIC VOL/BSA (12-30): 9.2 CM2
BH CV ECHO MEAS - LVIDD: 2.9 CM
BH CV ECHO MEAS - LVIDS: 1.62 CM
BH CV ECHO MEAS - LVOT AREA: 2.02 CM2
BH CV ECHO MEAS - LVOT DIAM: 1.61 CM
BH CV ECHO MEAS - LVPWD: 1.26 CM
BH CV ECHO MEAS - MED PEAK E' VEL: 6.7 CM/SEC
BH CV ECHO MEAS - MV A MAX VEL: 111.8 CM/SEC
BH CV ECHO MEAS - MV DEC SLOPE: 210.6 CM/SEC2
BH CV ECHO MEAS - MV E MAX VEL: 89.5 CM/SEC
BH CV ECHO MEAS - MV E/A: 0.8
BH CV ECHO MEAS - RAP SYSTOLE: 10 MMHG
BH CV ECHO MEAS - RVDD: 2.9 CM
BH CV ECHO MEAS - RVSP: 62.5 MMHG
BH CV ECHO MEAS - SI(MOD-SP4): 21.7 ML/M2
BH CV ECHO MEAS - SV(MOD-SP4): 31.1 ML
BH CV ECHO MEAS - TR MAX PG: 52.5 MMHG
BH CV ECHO MEAS - TR MAX VEL: 362.4 CM/SEC
BH CV ECHO MEASUREMENTS AVERAGE E/E' RATIO: 11.4
LEFT ATRIUM VOLUME INDEX: 22.7 ML/M2
MAXIMAL PREDICTED HEART RATE: 141 BPM
STRESS TARGET HR: 120 BPM

## 2022-04-13 PROCEDURE — 93306 TTE W/DOPPLER COMPLETE: CPT | Performed by: INTERNAL MEDICINE

## 2022-04-13 PROCEDURE — 93306 TTE W/DOPPLER COMPLETE: CPT

## 2022-04-13 NOTE — PROGRESS NOTES
EF 56-60%, mild MR, no AS, RVSP 63mmHg, doubled from prior. Pulmonary referral warranted. Would she be interested in referral?

## 2022-04-14 ENCOUNTER — TELEPHONE (OUTPATIENT)
Dept: CARDIOLOGY | Facility: CLINIC | Age: 77
End: 2022-04-14

## 2022-04-14 DIAGNOSIS — I27.20 PULMONARY HTN: Primary | ICD-10-CM

## 2022-04-14 DIAGNOSIS — R06.02 SHORTNESS OF BREATH: ICD-10-CM

## 2022-04-14 NOTE — TELEPHONE ENCOUNTER
Referral to Dr. Anguiano for pulmonary eval in. Pulm HTN is reason.        HISTORY OF PRESENT ILLNESS: HPI:  23 yo male with PMH of asthma presented to his PCP with complaints of headache, nausea and vomiting x 5 days. Pt was noted to have elevated sBP of >170 and was advised to come to emergency department. Patient states he started having headaches 5 days ago associated with nausea and 1 day hx of NBNB vomiting. Pt endorses episode of dysuria few days ago which has now resolved. Pt denies head trauma, any prior episodes of similar headaches. No family hx of Polycystic kidney disease, CVA, HTN. Denies diarrhea, abdominal pain, fever, chills, recent viral infection, no blood in urine, no flank pain, no urinary rentention, NO CP, No SOB ore LOC.  Patient denies Use of over the counter supplements or illicit drug use      PAST MEDICAL & SURGICAL HISTORY:  Asthma    No significant past surgical history            MEDICATIONS:  MEDICATIONS  (STANDING):  amLODIPine   Tablet 5 milliGRAM(s) Oral daily  budesonide  80 MICROgram(s)/formoterol 4.5 MICROgram(s) Inhaler 2 Puff(s) Inhalation two times a day  lactated ringers. 1000 milliLiter(s) (60 mL/Hr) IV Continuous <Continuous>      Allergies    Kiwi (Swelling)  No Known Drug Allergies    Intolerances        FAMILY HISTORY:    Non-contributary for premature coronary disease or sudden cardiac death    SOCIAL HISTORY:    [ X] Non-smoker  [ ] Smoker  [ ] Alcohol    FLU VACCINE THIS YEAR STARTS IN AUGUST:  [ ] Yes    [ ] No    IF OVER 65 HAVE YOU EVER HAD A PNA VACCINE:  [ ] Yes    [ ] No       [ ] N/A      REVIEW OF SYSTEMS:  [ ]chest pain  [  ]shortness of breath  [  ]palpitations  [  ]syncope  [ ]near syncope [ ]upper extremity weakness   [ ] lower extremity weakness  [  ]diplopia  [  ]altered mental status   [  ]fevers  [ ]chills [ ]nausea  [ ]vomitting  [  ]dysphagia    [ ]abdominal pain  [ ]melena  [ ]BRBPR    [  ]epistaxis  [  ]rash    [ ]lower extremity edema        [ ] All others negative	  [ ] Unable to obtain      LABS:	 	    CARDIAC MARKERS:  CARDIAC MARKERS ( 23 Feb 2021 09:13 )  <0.015 ng/mL / x     / 683 U/L / x     / x      CARDIAC MARKERS ( 22 Feb 2021 21:45 )  <0.015 ng/mL / x     / 1141 U/L / x     / 1.2 ng/mL  CARDIAC MARKERS ( 22 Feb 2021 19:24 )  <0.015 ng/mL / x     / x     / x     / x      CARDIAC MARKERS ( 22 Feb 2021 16:39 )  <0.015 ng/mL / x     / x     / x     / x                                  11.2   7.54  )-----------( 205      ( 23 Feb 2021 09:13 )             32.8     Hb Trend: 11.2<--, 11.0<--    02-23    136  |  106  |  61<H>  ----------------------------<  94  5.0   |  19<L>  |  6.00<H>    Ca    9.3      23 Feb 2021 09:13  Phos  4.7     02-23  Mg     2.3     02-23    TPro  7.0  /  Alb  3.4<L>  /  TBili  0.5  /  DBili  x   /  AST  56<H>  /  ALT  60  /  AlkPhos  92  02-23    Creatinine Trend: 6.00<--, 5.80<--, 6.17<--    Coags:  PT/INR - ( 23 Feb 2021 09:13 )   PT: 13.0 sec;   INR: 1.10 ratio         PTT - ( 23 Feb 2021 09:13 )  PTT:34.3 sec    TSH: Thyroid Stimulating Hormone, Serum: 2.62 uU/mL (02-23 @ 09:13)      PHYSICAL EXAM:  T(C): 36.6 (02-23-21 @ 07:40), Max: 37.2 (02-23-21 @ 04:30)  HR: 60 (02-23-21 @ 07:40) (60 - 95)  BP: 130/63 (02-23-21 @ 07:40) (130/63 - 185/114)  RR: 18 (02-23-21 @ 07:40) (16 - 20)  SpO2: 100% (02-23-21 @ 07:40) (96% - 100%)  Wt(kg): --   BMI (kg/m2): 20.7 (02-22-21 @ 16:09)  I&O's Summary    22 Feb 2021 07:01  -  23 Feb 2021 07:00  --------------------------------------------------------  IN: 480 mL / OUT: 350 mL / NET: 130 mL      HEENT:  (-)icterus (-)pallor  CV: N S1 S2 1/6 SHAGUFTA (+)2 Pulses B/l  Resp:  Clear to ausculatation B/L, normal effort  GI: (+) BS Soft, NT, ND  Lymph:  (-)Edema, (-)obvious lymphadenopathy  Skin: Warm to touch, Normal turgor  Psych: Appropriate mood and affect        TELEMETRY: 	  sinus, Sinus TAch    ECG:  	    RADIOLOGY:         CXR:     ASSESSMENT/PLAN: 	24y Male         HISTORY OF PRESENT ILLNESS: HPI:  25 yo male with PMH of asthma presented to his PCP with complaints of headache, nausea and vomiting x 5 days. Pt was noted to have elevated sBP of >170 and was advised to come to emergency department. Patient states he started having headaches 5 days ago associated with nausea and 1 day hx of NBNB vomiting. Pt endorses episode of dysuria few days ago which has now resolved. Pt denies head trauma, any prior episodes of similar headaches. No family hx of Polycystic kidney disease, CVA, HTN. Denies diarrhea, abdominal pain, fever, chills, recent viral infection, no blood in urine, no flank pain, no urinary rentention, NO CP, No SOB ore LOC.  Patient denies Use of over the counter supplements or illicit drug use      PAST MEDICAL & SURGICAL HISTORY:  Asthma    No significant past surgical history            MEDICATIONS:  MEDICATIONS  (STANDING):  amLODIPine   Tablet 5 milliGRAM(s) Oral daily  budesonide  80 MICROgram(s)/formoterol 4.5 MICROgram(s) Inhaler 2 Puff(s) Inhalation two times a day  lactated ringers. 1000 milliLiter(s) (60 mL/Hr) IV Continuous <Continuous>      Allergies    Kiwi (Swelling)  No Known Drug Allergies    Intolerances        FAMILY HISTORY:    Non-contributary for premature coronary disease or sudden cardiac death    SOCIAL HISTORY:    [ X] Non-smoker  [ ] Smoker  [ ] Alcohol      REVIEW OF SYSTEMS:  [ ]chest pain  [  ]shortness of breath  [  ]palpitations  [  ]syncope  [ ]near syncope [ ]upper extremity weakness   [ ] lower extremity weakness  [  ]diplopia  [  ]altered mental status   [  ]fevers  [ ]chills [ ]nausea  [ ]vomitting  [  ]dysphagia    [ ]abdominal pain  [ ]melena  [ ]BRBPR    [  ]epistaxis  [  ]rash    [ ]lower extremity edema        [X ] All others negative	  [ ] Unable to obtain      LABS:	 	    CARDIAC MARKERS:  CARDIAC MARKERS ( 23 Feb 2021 09:13 )  <0.015 ng/mL / x     / 683 U/L / x     / x      CARDIAC MARKERS ( 22 Feb 2021 21:45 )  <0.015 ng/mL / x     / 1141 U/L / x     / 1.2 ng/mL  CARDIAC MARKERS ( 22 Feb 2021 19:24 )  <0.015 ng/mL / x     / x     / x     / x      CARDIAC MARKERS ( 22 Feb 2021 16:39 )  <0.015 ng/mL / x     / x     / x     / x                                  11.2   7.54  )-----------( 205      ( 23 Feb 2021 09:13 )             32.8     Hb Trend: 11.2<--, 11.0<--    02-23    136  |  106  |  61<H>  ----------------------------<  94  5.0   |  19<L>  |  6.00<H>    Ca    9.3      23 Feb 2021 09:13  Phos  4.7     02-23  Mg     2.3     02-23    TPro  7.0  /  Alb  3.4<L>  /  TBili  0.5  /  DBili  x   /  AST  56<H>  /  ALT  60  /  AlkPhos  92  02-23    Creatinine Trend: 6.00<--, 5.80<--, 6.17<--    Coags:  PT/INR - ( 23 Feb 2021 09:13 )   PT: 13.0 sec;   INR: 1.10 ratio         PTT - ( 23 Feb 2021 09:13 )  PTT:34.3 sec    TSH: Thyroid Stimulating Hormone, Serum: 2.62 uU/mL (02-23 @ 09:13)      PHYSICAL EXAM:  T(C): 36.6 (02-23-21 @ 07:40), Max: 37.2 (02-23-21 @ 04:30)  HR: 60 (02-23-21 @ 07:40) (60 - 95)  BP: 130/63 (02-23-21 @ 07:40) (130/63 - 185/114)  RR: 18 (02-23-21 @ 07:40) (16 - 20)  SpO2: 100% (02-23-21 @ 07:40) (96% - 100%)  Wt(kg): --   BMI (kg/m2): 20.7 (02-22-21 @ 16:09)  I&O's Summary    22 Feb 2021 07:01  -  23 Feb 2021 07:00  --------------------------------------------------------  IN: 480 mL / OUT: 350 mL / NET: 130 mL      HEENT:  (-)icterus (-)pallor  CV: N S1 S2 1/6 SHAGUFTA (+)2 Pulses B/l  Resp:  Clear to ausculatation B/L, normal effort  GI: (+) BS Soft, NT, ND  Lymph:  (-)Edema, (-)obvious lymphadenopathy  Skin: Warm to touch, Normal turgor  Psych: Appropriate mood and affect        TELEMETRY: 	  sinus, Sinus TAch    ECG:  	    RADIOLOGY:         CXR:     ASSESSMENT/PLAN: 	24y Male         HISTORY OF PRESENT ILLNESS: HPI:  25 yo male with PMH of asthma presented to his PCP with complaints of headache, nausea and vomiting x 5 days. Pt was noted to have elevated sBP of >170 and was advised to come to emergency department. Patient states he started having headaches 5 days ago associated with nausea and 1 day hx of NBNB vomiting. Pt endorses episode of dysuria few days ago which has now resolved. Pt denies head trauma, any prior episodes of similar headaches. No family hx of Polycystic kidney disease, CVA, HTN. Denies diarrhea, abdominal pain, fever, chills, recent viral infection, no blood in urine, no flank pain, no urinary rentention, NO CP, No SOB ore LOC.  Patient denies Use of over the counter supplements or illicit drug use      PAST MEDICAL & SURGICAL HISTORY:  Asthma    No significant past surgical history            MEDICATIONS:  MEDICATIONS  (STANDING):  amLODIPine   Tablet 5 milliGRAM(s) Oral daily  budesonide  80 MICROgram(s)/formoterol 4.5 MICROgram(s) Inhaler 2 Puff(s) Inhalation two times a day  lactated ringers. 1000 milliLiter(s) (60 mL/Hr) IV Continuous <Continuous>      Allergies    Kiwi (Swelling)  No Known Drug Allergies    Intolerances        FAMILY HISTORY:    Non-contributary for premature coronary disease or sudden cardiac death    SOCIAL HISTORY:    [ X] Non-smoker  [ ] Smoker  [ ] Alcohol      REVIEW OF SYSTEMS:  [ ]chest pain  [  ]shortness of breath  [  ]palpitations  [  ]syncope  [ ]near syncope [ ]upper extremity weakness   [ ] lower extremity weakness  [  ]diplopia  [  ]altered mental status   [  ]fevers  [ ]chills [ ]nausea  [ ]vomitting  [  ]dysphagia    [ ]abdominal pain  [ ]melena  [ ]BRBPR    [  ]epistaxis  [  ]rash    [ ]lower extremity edema        [X ] All others negative	  [ ] Unable to obtain      LABS:	 	    CARDIAC MARKERS:  CARDIAC MARKERS ( 23 Feb 2021 09:13 )  <0.015 ng/mL / x     / 683 U/L / x     / x      CARDIAC MARKERS ( 22 Feb 2021 21:45 )  <0.015 ng/mL / x     / 1141 U/L / x     / 1.2 ng/mL  CARDIAC MARKERS ( 22 Feb 2021 19:24 )  <0.015 ng/mL / x     / x     / x     / x      CARDIAC MARKERS ( 22 Feb 2021 16:39 )  <0.015 ng/mL / x     / x     / x     / x                                  11.2   7.54  )-----------( 205      ( 23 Feb 2021 09:13 )             32.8     Hb Trend: 11.2<--, 11.0<--    02-23    136  |  106  |  61<H>  ----------------------------<  94  5.0   |  19<L>  |  6.00<H>    Ca    9.3      23 Feb 2021 09:13  Phos  4.7     02-23  Mg     2.3     02-23    TPro  7.0  /  Alb  3.4<L>  /  TBili  0.5  /  DBili  x   /  AST  56<H>  /  ALT  60  /  AlkPhos  92  02-23    Creatinine Trend: 6.00<--, 5.80<--, 6.17<--    Coags:  PT/INR - ( 23 Feb 2021 09:13 )   PT: 13.0 sec;   INR: 1.10 ratio         PTT - ( 23 Feb 2021 09:13 )  PTT:34.3 sec    TSH: Thyroid Stimulating Hormone, Serum: 2.62 uU/mL (02-23 @ 09:13)      PHYSICAL EXAM:  T(C): 36.6 (02-23-21 @ 07:40), Max: 37.2 (02-23-21 @ 04:30)  HR: 60 (02-23-21 @ 07:40) (60 - 95)  BP: 130/63 (02-23-21 @ 07:40) (130/63 - 185/114)  RR: 18 (02-23-21 @ 07:40) (16 - 20)  SpO2: 100% (02-23-21 @ 07:40) (96% - 100%)  Wt(kg): --   BMI (kg/m2): 20.7 (02-22-21 @ 16:09)  I&O's Summary    22 Feb 2021 07:01  -  23 Feb 2021 07:00  --------------------------------------------------------  IN: 480 mL / OUT: 350 mL / NET: 130 mL      HEENT:  (-)icterus (-)pallor  CV: N S1 S2 1/6 SHAGUFTA (+)2 Pulses B/l  Resp:  Clear to ausculatation B/L, normal effort  GI: (+) BS Soft, NT, ND  Lymph:  (-)Edema, (-)obvious lymphadenopathy  Skin: Warm to touch, Normal turgor  Psych: Appropriate mood and affect        TELEMETRY: 	  sinus, Sinus TAch    ECG:  	sinus 70 BPM, sinus arrythmia      ASSESSMENT/PLAN: 	24y Male  PMH of asthma presented to his PCP with complaints of headache, nausea and vomiting x 5 days found with hypertnesive urgency and CIARRA.    - Started on Norvasc.  Unclear if the increased BP is a result of the renal failure or the cause  -  Check Serum renin and tim levels  - 24 hour urin collection for urine catecholamines and metanephrines  - TSH within normal Limits  - echo  - renal f/u     I once again thank you for allowing me to participate in the care of your patient.  If you have any questions or concerns please do not hesitate to contact me.      Yehuda Peres MD, [facility]  BEEPER [phone]           HISTORY OF PRESENT ILLNESS: HPI:  23 yo male with PMH of asthma presented to his PCP with complaints of headache, nausea and vomiting x 5 days. Pt was noted to have elevated sBP of >170 and was advised to come to emergency department. Patient states he started having headaches 5 days ago associated with nausea and 1 day hx of NBNB vomiting. Pt endorses episode of dysuria few days ago which has now resolved. Pt denies head trauma, any prior episodes of similar headaches. No family hx of Polycystic kidney disease, CVA, HTN. Denies diarrhea, abdominal pain, fever, chills, recent viral infection, no blood in urine, no flank pain, no urinary rentention, NO CP, No SOB ore LOC.  Patient denies Use of over the counter supplements or illicit drug use      PAST MEDICAL & SURGICAL HISTORY:  Asthma    No significant past surgical history            MEDICATIONS:  MEDICATIONS  (STANDING):  amLODIPine   Tablet 5 milliGRAM(s) Oral daily  budesonide  80 MICROgram(s)/formoterol 4.5 MICROgram(s) Inhaler 2 Puff(s) Inhalation two times a day  lactated ringers. 1000 milliLiter(s) (60 mL/Hr) IV Continuous <Continuous>      Allergies    Kiwi (Swelling)  No Known Drug Allergies    Intolerances        FAMILY HISTORY:    Non-contributary for premature coronary disease or sudden cardiac death    SOCIAL HISTORY:    [ X] Non-smoker  [ ] Smoker  [ ] Alcohol      REVIEW OF SYSTEMS:  [ ]chest pain  [  ]shortness of breath  [  ]palpitations  [  ]syncope  [ ]near syncope [ ]upper extremity weakness   [ ] lower extremity weakness  [  ]diplopia  [  ]altered mental status   [  ]fevers  [ ]chills [ ]nausea  [ ]vomitting  [  ]dysphagia    [ ]abdominal pain  [ ]melena  [ ]BRBPR    [  ]epistaxis  [  ]rash    [ ]lower extremity edema        [X ] All others negative	  [ ] Unable to obtain      LABS:	 	    CARDIAC MARKERS:  CARDIAC MARKERS ( 23 Feb 2021 09:13 )  <0.015 ng/mL / x     / 683 U/L / x     / x      CARDIAC MARKERS ( 22 Feb 2021 21:45 )  <0.015 ng/mL / x     / 1141 U/L / x     / 1.2 ng/mL  CARDIAC MARKERS ( 22 Feb 2021 19:24 )  <0.015 ng/mL / x     / x     / x     / x      CARDIAC MARKERS ( 22 Feb 2021 16:39 )  <0.015 ng/mL / x     / x     / x     / x                                  11.2   7.54  )-----------( 205      ( 23 Feb 2021 09:13 )             32.8     Hb Trend: 11.2<--, 11.0<--    02-23    136  |  106  |  61<H>  ----------------------------<  94  5.0   |  19<L>  |  6.00<H>    Ca    9.3      23 Feb 2021 09:13  Phos  4.7     02-23  Mg     2.3     02-23    TPro  7.0  /  Alb  3.4<L>  /  TBili  0.5  /  DBili  x   /  AST  56<H>  /  ALT  60  /  AlkPhos  92  02-23    Creatinine Trend: 6.00<--, 5.80<--, 6.17<--    Coags:  PT/INR - ( 23 Feb 2021 09:13 )   PT: 13.0 sec;   INR: 1.10 ratio         PTT - ( 23 Feb 2021 09:13 )  PTT:34.3 sec    TSH: Thyroid Stimulating Hormone, Serum: 2.62 uU/mL (02-23 @ 09:13)      PHYSICAL EXAM:  T(C): 36.6 (02-23-21 @ 07:40), Max: 37.2 (02-23-21 @ 04:30)  HR: 60 (02-23-21 @ 07:40) (60 - 95)  BP: 130/63 (02-23-21 @ 07:40) (130/63 - 185/114)  RR: 18 (02-23-21 @ 07:40) (16 - 20)  SpO2: 100% (02-23-21 @ 07:40) (96% - 100%)  Wt(kg): --   BMI (kg/m2): 20.7 (02-22-21 @ 16:09)  I&O's Summary    22 Feb 2021 07:01  -  23 Feb 2021 07:00  --------------------------------------------------------  IN: 480 mL / OUT: 350 mL / NET: 130 mL      HEENT:  (-)icterus (-)pallor  CV: N S1 S2 1/6 SHAGUFTA (+)2 Pulses B/l  Resp:  Clear to ausculatation B/L, normal effort  GI: (+) BS Soft, NT, ND  Lymph:  (-)Edema, (-)obvious lymphadenopathy  Skin: Warm to touch, Normal turgor  Psych: Appropriate mood and affect        TELEMETRY: 	  sinus, Sinus TAch    ECG:  	sinus 70 BPM, sinus arrythmia      ASSESSMENT/PLAN: 	24y Male  PMH of asthma presented to his PCP with complaints of headache, nausea and vomiting x 5 days found with hypertnesive urgency and CIARRA.    - Started on Norvasc.  Unclear if the increased BP is a result of the renal failure or the cause  -  Check Serum renin and tim levels  - 24 hour urine collection for urine catecholamines and metanephrines  - TSH within normal Limits  - echo  - renal f/u     I once again thank you for allowing me to participate in the care of your patient.  If you have any questions or concerns please do not hesitate to contact me.      Yehuda Peres MD, [facility]  BEEPER [phone]

## 2022-09-22 ENCOUNTER — OFFICE VISIT (OUTPATIENT)
Dept: CARDIOLOGY | Facility: CLINIC | Age: 77
End: 2022-09-22

## 2022-09-22 VITALS
WEIGHT: 100 LBS | DIASTOLIC BLOOD PRESSURE: 90 MMHG | HEIGHT: 62 IN | BODY MASS INDEX: 18.4 KG/M2 | SYSTOLIC BLOOD PRESSURE: 200 MMHG | HEART RATE: 60 BPM

## 2022-09-22 DIAGNOSIS — I10 ESSENTIAL HYPERTENSION: ICD-10-CM

## 2022-09-22 DIAGNOSIS — F41.9 ANXIETY: ICD-10-CM

## 2022-09-22 DIAGNOSIS — R01.1 HEART MURMUR: ICD-10-CM

## 2022-09-22 DIAGNOSIS — R06.02 SHORTNESS OF BREATH: ICD-10-CM

## 2022-09-22 DIAGNOSIS — E78.5 HYPERLIPIDEMIA LDL GOAL <100: ICD-10-CM

## 2022-09-22 DIAGNOSIS — I27.20 PULMONARY HTN: Primary | ICD-10-CM

## 2022-09-22 PROCEDURE — 99214 OFFICE O/P EST MOD 30 MIN: CPT | Performed by: NURSE PRACTITIONER

## 2022-09-22 RX ORDER — LORATADINE 10 MG/1
10 TABLET ORAL DAILY PRN
COMMUNITY

## 2022-09-22 RX ORDER — CLONIDINE HYDROCHLORIDE 0.1 MG/1
TABLET ORAL
Qty: 180 TABLET | Refills: 2 | Status: SHIPPED | OUTPATIENT
Start: 2022-09-22

## 2022-09-22 RX ORDER — EZETIMIBE 10 MG/1
10 TABLET ORAL DAILY
COMMUNITY
Start: 2022-09-02 | End: 2022-12-08 | Stop reason: SDUPTHER

## 2022-09-22 NOTE — PROGRESS NOTES
Chief Complaint   Patient presents with   • Follow-up     For cardiac management. Patient is not on aspirin. Last lab work was done at the [facility] in [facility], not in chart. Is now on zetia per PCP. States that she does occasionally get short of breath, but states that is not new for her. States that she occasionally has palpitations. States that she took allergy medicine last night.    • Med Refill     PCP does medication refills. Went over medications verbally.       Cardiac Complaints  dyspnea      Subjective   Maryellen Fernando is a 77 y.o. female with significant HTN, hypothyroidism, anxiety, mitral regurgitation, and hyperlipidemia whose cardiac workup showed normal coronaries and moderate renal artery stenosis. Most recent echo in 2016 showed normal LV function and mod MR.  She is allergic to almost every medication except Tekturna.  She also takes low dose of HCTZ as needed for elevated bp. She is quite anxious and patient reports ativan brings her blood pressure down as well. Echo repeated April 2022 which showed normal EF and MR and AI remained mild. PA pressure elevated, pulmonary referral advised.    She comes today for follow up and admits she saw pulmonary, but she states she did not complete PFT as she could not wear the mask on her face. She does report pulmonary wanting repeat echo to assess. She does report SOA at times, but not often. Her main complaint continues to be anxiety, but she states she is not taking her ativan routinely. She admits this drives up her blood pressure and she states her allergies are also making it higher. Labs done with PCP, recently added zetia for better cholesterol management. No refills needed.         Cardiac History  Past Surgical History:   Procedure Laterality Date   • CARDIAC CATHETERIZATION  03/20/2014    Cath-Normal Coronaries. (L) renal artery-60%.   • CARDIOVASCULAR STRESS TEST  02/20/2014    Stress-(Mod) 9Min, 71%THR. R/O Inferior Ischemia.   • ECHO -  CONVERTED  02/20/2014    Echo-EF 65%. Inferior WMA.   • ECHO - CONVERTED  12/20/2016    EF 60%, mild to mod MR, diastolic dysfunction   • ECHO - CONVERTED  07/15/2019    @ [facility]. EF 65%. Mild MR & AI. RVSP- 35 mmHg   • ECHO - CONVERTED  04/13/2022    EF 60%. Mild MR. RVSP- 63 mmHg   • HYSTERECTOMY         Current Outpatient Medications   Medication Sig Dispense Refill   • aliskiren (TEKTURNA) 300 MG tablet Take 1 tablet by mouth Daily. 90 tablet 2   • cloNIDine (Catapres) 0.1 MG tablet Take twice a day as needed for BP management after meds, SBP >160 DBP>90 180 tablet 2   • ezetimibe (ZETIA) 10 MG tablet Take 10 mg by mouth Daily.     • hydroCHLOROthiazide (HYDRODIURIL) 25 MG tablet Take 0.5 tablets by mouth Daily As Needed (daily as needed for HTN). 90 tablet 2   • levothyroxine (SYNTHROID, LEVOTHROID) 75 MCG tablet Take 75 mcg by mouth Daily.     • loratadine (CLARITIN) 10 MG tablet Take 10 mg by mouth Daily As Needed for Allergies.     • LORazepam (ATIVAN) 0.5 MG tablet 1/2 tablet daily prn       No current facility-administered medications for this visit.       Albuterol, Edarbyclor [azilsartan-chlorthalidone], Flagyl [metronidazole], Lopid [gemfibrozil], Minoxidil, Sulfa antibiotics, Avapro [irbesartan], Beconase [beclomethasone], Benicar [olmesartan], Buspar [buspirone], Clarithromycin, Clonidine derivatives, Coreg [carvedilol], Cozaar [losartan potassium], Diltiazem, Donnatal [phenobarbital-belladonna alk], Penicillins, Toprol xl [metoprolol tartrate], and Zestril [lisinopril]    Past Medical History:   Diagnosis Date   • Anxiety    • Anxiety    • Esophageal reflux    • H/O: hysterectomy    • History of colonoscopy 12/2021   • History of COVID-19    • History of esophagogastroduodenoscopy (EGD) 12/2021   • Hypercholesterolemia    • Hypertension    • Hypothyroidism    • Osteoporosis    • Scoliosis    • TB (tuberculosis)     Treated a year ago for TB       Social History     Socioeconomic History   • Marital  "status:    Tobacco Use   • Smoking status: Former Smoker     Quit date: 1966     Years since quittin.8   • Smokeless tobacco: Never Used   • Tobacco comment: smoked for maybe 5 years as a teenager..    Vaping Use   • Vaping Use: Never used   Substance and Sexual Activity   • Alcohol use: No   • Drug use: No       Family History   Problem Relation Age of Onset   • Diabetes Mother    • Heart disease Mother    • Heart disease Father    • Diabetes Other         Diabetes   • Heart disease Other    • Stroke Other    • Other Other         Siblings: CABG; Cardiac stenting       Review of Systems   Constitutional: Negative for malaise/fatigue and night sweats.   Cardiovascular: Positive for dyspnea on exertion. Negative for chest pain, claudication, irregular heartbeat, leg swelling, near-syncope, orthopnea, palpitations and syncope.   Respiratory: Positive for shortness of breath. Negative for cough and wheezing.    Musculoskeletal: Positive for stiffness. Negative for back pain and joint pain.   Gastrointestinal: Negative for anorexia, heartburn, melena, nausea and vomiting.   Genitourinary: Negative for dysuria, hematuria, hesitancy and nocturia.   Neurological: Negative for dizziness, headaches and light-headedness.   Psychiatric/Behavioral: Negative for depression. The patient is nervous/anxious.            Objective     BP (!) 200/90 (BP Location: Right arm)   Pulse 60   Ht 157.5 cm (62.01\")   Wt 45.4 kg (100 lb)   BMI 18.29 kg/m²     Constitutional:       Appearance: Not in distress.   Eyes:      Pupils: Pupils are equal, round, and reactive to light.   HENT:      Nose: Nose normal.   Pulmonary:      Effort: Pulmonary effort is normal.      Breath sounds: Normal breath sounds.   Cardiovascular:      PMI at left midclavicular line. Normal rate. Regular rhythm.      Murmurs: There is a systolic murmur.   Abdominal:      Palpations: Abdomen is soft.   Musculoskeletal: Normal range of motion.      " Cervical back: Normal range of motion and neck supple. Skin:     General: Skin is warm and dry.   Neurological:      Mental Status: Alert.         Procedures         Diagnoses and all orders for this visit:    1. Pulmonary HTN (HCC) (Primary)  -     Adult Transthoracic Echo Complete W/ Cont if Necessary Per Protocol; Future    2. Essential hypertension    3. Shortness of breath  -     Adult Transthoracic Echo Complete W/ Cont if Necessary Per Protocol; Future    4. Hyperlipidemia LDL goal <100    5. Heart murmur  -     Adult Transthoracic Echo Complete W/ Cont if Necessary Per Protocol; Future    6. Anxiety    Other orders  -     cloNIDine (Catapres) 0.1 MG tablet; Take twice a day as needed for BP management after meds, SBP >160 DBP>90  Dispense: 180 tablet; Refill: 2             HTN:  Blood pressure not well managed, she admits it is stress at home. She continues use of ativan daily for anxiety management. Limited sodium advised. Same tekturna and HCTZ continued. She was strongly urged to continue use of clonidine for elevation on as needed basis.  She did not take this AM, but her  and , urged to follow parameters.     Anxiety:  Not taking her ativan routinely.  She was urged to take routinely for better anxiety management, BP has not been at goal.      Murmur/SOA/pulm HTN:  Echo 2022 showed EF stable as well as valve areas, RVSP increased to 63mmHg, repeat recommended to reassess pulmonary pressures, more recommendations to follow.    Hyperlipidemia:  Now on zetia per PCP. FLP monitored by PCP office. Limited carb diet urged.     Hypothyroid:  Managed with synthroid therapy. TSH with your office, heart rate well managed,no arrhythmia noted. Can we have recent for review?     BMI stable at 18.29, continued cardiac diet with adequate protein and limited sodium advised.  Protein supplementation urged.     Refills of clonidine sent.     6 month follow up recommended or sooner if needed.        Problems  Addressed this Visit        Cardiac and Vasculature    Essential hypertension    Relevant Medications    cloNIDine (Catapres) 0.1 MG tablet      Other Visit Diagnoses     Pulmonary HTN (HCC)    -  Primary    Relevant Orders    Adult Transthoracic Echo Complete W/ Cont if Necessary Per Protocol    Shortness of breath        Relevant Orders    Adult Transthoracic Echo Complete W/ Cont if Necessary Per Protocol    Hyperlipidemia LDL goal <100        Relevant Medications    ezetimibe (ZETIA) 10 MG tablet    Heart murmur        Relevant Orders    Adult Transthoracic Echo Complete W/ Cont if Necessary Per Protocol    Anxiety          Diagnoses       Codes Comments    Pulmonary HTN (HCC)    -  Primary ICD-10-CM: I27.20  ICD-9-CM: 416.8     Essential hypertension     ICD-10-CM: I10  ICD-9-CM: 401.9     Shortness of breath     ICD-10-CM: R06.02  ICD-9-CM: 786.05     Hyperlipidemia LDL goal <100     ICD-10-CM: E78.5  ICD-9-CM: 272.4     Heart murmur     ICD-10-CM: R01.1  ICD-9-CM: 785.2     Anxiety     ICD-10-CM: F41.9  ICD-9-CM: 300.00                     Electronically signed by Roselyn Leo, APRN September 22, 2022 11:49 EDT

## 2022-10-10 ENCOUNTER — HOSPITAL ENCOUNTER (OUTPATIENT)
Dept: CARDIOLOGY | Facility: HOSPITAL | Age: 77
Discharge: HOME | End: 2022-10-10
Admitting: NURSE PRACTITIONER

## 2022-10-10 VITALS — BODY MASS INDEX: 18.42 KG/M2 | HEIGHT: 62 IN | WEIGHT: 100.09 LBS

## 2022-10-10 DIAGNOSIS — R06.02 SHORTNESS OF BREATH: ICD-10-CM

## 2022-10-10 DIAGNOSIS — R01.1 HEART MURMUR: ICD-10-CM

## 2022-10-10 DIAGNOSIS — I27.20 PULMONARY HTN: ICD-10-CM

## 2022-10-10 PROCEDURE — 93306 TTE W/DOPPLER COMPLETE: CPT | Performed by: INTERNAL MEDICINE

## 2022-10-10 PROCEDURE — 93306 TTE W/DOPPLER COMPLETE: CPT

## 2022-10-11 LAB
AORTIC DIMENSIONLESS INDEX: 0.72 (DI)
BH CV ECHO MEAS - ACS: 1.67 CM
BH CV ECHO MEAS - AO MAX PG: 8 MMHG
BH CV ECHO MEAS - AO MEAN PG: 3.5 MMHG
BH CV ECHO MEAS - AO ROOT DIAM: 3 CM
BH CV ECHO MEAS - AO V2 MAX: 141 CM/SEC
BH CV ECHO MEAS - AO V2 VTI: 34.9 CM
BH CV ECHO MEAS - EDV(CUBED): 62.2 ML
BH CV ECHO MEAS - EF_3D-VOL: 59 %
BH CV ECHO MEAS - ESV(CUBED): 21.5 ML
BH CV ECHO MEAS - FS: 29.8 %
BH CV ECHO MEAS - IVS/LVPW: 1.21 CM
BH CV ECHO MEAS - IVSD: 1.05 CM
BH CV ECHO MEAS - LA DIMENSION: 3.2 CM
BH CV ECHO MEAS - LAT PEAK E' VEL: 6.6 CM/SEC
BH CV ECHO MEAS - LV MASS(C)D: 118.8 GRAMS
BH CV ECHO MEAS - LV MAX PG: 4.1 MMHG
BH CV ECHO MEAS - LV MEAN PG: 1.88 MMHG
BH CV ECHO MEAS - LV V1 MAX: 100.6 CM/SEC
BH CV ECHO MEAS - LV V1 VTI: 26 CM
BH CV ECHO MEAS - LVIDD: 4 CM
BH CV ECHO MEAS - LVIDS: 2.8 CM
BH CV ECHO MEAS - LVPWD: 0.87 CM
BH CV ECHO MEAS - MED PEAK E' VEL: 5.6 CM/SEC
BH CV ECHO MEAS - MR MAX PG: 91.8 MMHG
BH CV ECHO MEAS - MR MAX VEL: 479.1 CM/SEC
BH CV ECHO MEAS - MV A MAX VEL: 90 CM/SEC
BH CV ECHO MEAS - MV DEC SLOPE: 489.3 CM/SEC2
BH CV ECHO MEAS - MV DEC TIME: 0.26 MSEC
BH CV ECHO MEAS - MV E MAX VEL: 102 CM/SEC
BH CV ECHO MEAS - MV E/A: 1.13
BH CV ECHO MEAS - MV MAX PG: 5.7 MMHG
BH CV ECHO MEAS - MV MEAN PG: 1.82 MMHG
BH CV ECHO MEAS - MV P1/2T: 70 MSEC
BH CV ECHO MEAS - MV V2 VTI: 47.8 CM
BH CV ECHO MEAS - MVA(P1/2T): 3.1 CM2
BH CV ECHO MEAS - PA V2 MAX: 93.7 CM/SEC
BH CV ECHO MEAS - PI END-D VEL: 143.5 CM/SEC
BH CV ECHO MEAS - RAP SYSTOLE: 10 MMHG
BH CV ECHO MEAS - RV MAX PG: 1.49 MMHG
BH CV ECHO MEAS - RV V1 MAX: 61 CM/SEC
BH CV ECHO MEAS - RV V1 VTI: 14.9 CM
BH CV ECHO MEAS - RVDD: 2.8 CM
BH CV ECHO MEAS - RVSP: 68.8 MMHG
BH CV ECHO MEAS - TAPSE (>1.6): 1.99 CM
BH CV ECHO MEAS - TR MAX PG: 58.8 MMHG
BH CV ECHO MEAS - TR MAX VEL: 383.5 CM/SEC
BH CV ECHO MEASUREMENTS AVERAGE E/E' RATIO: 16.72
BH CV XLRA - TDI S': 9.3 CM/SEC
MAXIMAL PREDICTED HEART RATE: 141 BPM
STRESS TARGET HR: 120 BPM

## 2022-10-12 RX ORDER — SILDENAFIL CITRATE 20 MG/1
20 TABLET ORAL DAILY
Qty: 90 TABLET | Refills: 3 | Status: SHIPPED | OUTPATIENT
Start: 2022-10-12 | End: 2022-12-08

## 2022-10-12 NOTE — TELEPHONE ENCOUNTER
----- Message from EMIL aPl sent at 10/11/2022 11:39 AM EDT -----  RVSP continues to increase, now 69mmHg. Would she be willing to try sildenafil at 20mg daily?

## 2022-11-28 ENCOUNTER — TELEPHONE (OUTPATIENT)
Dept: CARDIOLOGY | Facility: CLINIC | Age: 77
End: 2022-11-28

## 2022-11-28 NOTE — TELEPHONE ENCOUNTER
EMIL Garcia called. Pt is at a clinic at [facility] with sinus infection. She reports pt having stopped the sildenafil due to it causing sinus problems. She said pt's BP today is 230/110 pulse 58. Pt had taken clonidine about an hour prior. After discussing with Roselyn, Claudette was given her recommendations to send her to ER.

## 2022-12-07 ENCOUNTER — TELEPHONE (OUTPATIENT)
Dept: CARDIOLOGY | Facility: CLINIC | Age: 77
End: 2022-12-07

## 2022-12-07 NOTE — TELEPHONE ENCOUNTER
Pt states that revatio caused her to have a lot of fluttering and she could not tolerate.  She had not been taking her ativan, but has been for the past 3 days.

## 2022-12-07 NOTE — TELEPHONE ENCOUNTER
Please let Maryellen know, I see the [facility] has scheduled her tomorrow. Advise that she needs to begin her ativan therapy if she is not taking it to aid in her BP management. Historically, every time she stops her nerve medication, her BP increases as well, because much of this is anxiety related. Please let her know, with her allergies to her medication, this very much complicates what she can take. It would also be VERY beneficial to her to begin her revatio therapy as it would help her BP and her Pulm HTN.

## 2022-12-08 ENCOUNTER — OFFICE VISIT (OUTPATIENT)
Dept: CARDIOLOGY | Facility: CLINIC | Age: 77
End: 2022-12-08

## 2022-12-08 VITALS
SYSTOLIC BLOOD PRESSURE: 114 MMHG | HEART RATE: 60 BPM | BODY MASS INDEX: 19.45 KG/M2 | HEIGHT: 61 IN | DIASTOLIC BLOOD PRESSURE: 82 MMHG | WEIGHT: 103 LBS

## 2022-12-08 DIAGNOSIS — R63.6 UNDERWEIGHT: ICD-10-CM

## 2022-12-08 DIAGNOSIS — I10 ESSENTIAL HYPERTENSION: ICD-10-CM

## 2022-12-08 DIAGNOSIS — R01.1 MURMUR, CARDIAC: Primary | ICD-10-CM

## 2022-12-08 DIAGNOSIS — I27.20 PULMONARY HYPERTENSION: ICD-10-CM

## 2022-12-08 PROCEDURE — 99213 OFFICE O/P EST LOW 20 MIN: CPT | Performed by: NURSE PRACTITIONER

## 2022-12-08 RX ORDER — EZETIMIBE 10 MG/1
10 TABLET ORAL DAILY
Qty: 90 TABLET | Refills: 2 | Status: SHIPPED | OUTPATIENT
Start: 2022-12-08

## 2022-12-08 RX ORDER — ALISKIREN 300 MG/1
300 TABLET, FILM COATED ORAL DAILY
Qty: 90 TABLET | Refills: 2 | Status: SHIPPED | OUTPATIENT
Start: 2022-12-08

## 2022-12-08 RX ORDER — HYDROCHLOROTHIAZIDE 25 MG/1
12.5 TABLET ORAL DAILY PRN
Qty: 90 TABLET | Refills: 2 | Status: SHIPPED | OUTPATIENT
Start: 2022-12-08

## 2022-12-08 NOTE — PROGRESS NOTES
Chief Complaint   Patient presents with   • Follow-up     Pt is here for cardiac follow up.  Pt states she was unable to tolerate sildenafil, due to palpitations.  She states her PCP told her not to take more then two clonidines per week until her BP is better controlled.  She denies CP, dizziness or SOB.  Pt does not take a daily ASA.   • Med Refill     Pt request 90 day refills to be sent to [facility].  Medications were verbalized by the pt.     • Lab Work     Pt states their last labs were about 3 months ago with her PCP.          Cardiac Complaints  none      Subjective   Maryellen Fernando is a 77 y.o. female with significant HTN, hypothyroidism, anxiety, mitral regurgitation, and hyperlipidemia whose cardiac workup showed normal coronaries and moderate renal artery stenosis. Most recent echo in 2016 showed normal LV function and mod MR.  She is allergic to almost every medication except Tekturna.  She also takes low dose of HCTZ as needed for elevated bp. She is quite anxious and patient reports ativan brings her blood pressure down as well. Echo repeated April 2022 which showed normal EF and MR and AI remained mild. PA pressure elevated, pulmonary referral advised.Echo repeated in October 2022 showed RVSP at 69mmHg, revatio started, but she could not tolerate due to side effects.     She comes today for follow up and denies any new concerns. No CP, SOA, palpitations, or syncope reported. TIA symptoms denied. We got a call in November with elevated BP, PCP encouraged to have her take her ativan routinely. She does come today and admits to taking ativan routinely. She states since she has been on her ativan BP has been well managed. She does report taking clonidine as needed for BP management, but has been trying to wean. Labs with PCP 3 months, no current available. Refills requested for 90 day supply.                   Cardiac History  Past Surgical History:   Procedure Laterality Date   • CARDIAC  CATHETERIZATION  03/20/2014    Cath-Normal Coronaries. (L) renal artery-60%.   • CARDIOVASCULAR STRESS TEST  02/20/2014    Stress-(Mod) 9Min, 71%THR. R/O Inferior Ischemia.   • ECHO - CONVERTED  02/20/2014    Echo-EF 65%. Inferior WMA.   • ECHO - CONVERTED  12/20/2016    EF 60%, mild to mod MR, diastolic dysfunction   • ECHO - CONVERTED  07/15/2019    @ [facility]. EF 65%. Mild MR & AI. RVSP- 35 mmHg   • ECHO - CONVERTED  04/13/2022    EF 60%. Mild MR. RVSP- 63 mmHg   • ECHO - CONVERTED  10/10/2022    EF 60%,  Mild MR. RVSP- 69 mmHg   • HYSTERECTOMY         Current Outpatient Medications   Medication Sig Dispense Refill   • aliskiren (TEKTURNA) 300 MG tablet Take 1 tablet by mouth Daily. 90 tablet 2   • cloNIDine (Catapres) 0.1 MG tablet Take twice a day as needed for BP management after meds, SBP >160 DBP>90 180 tablet 2   • ezetimibe (ZETIA) 10 MG tablet Take 1 tablet by mouth Daily. 90 tablet 2   • hydroCHLOROthiazide (HYDRODIURIL) 25 MG tablet Take 0.5 tablets by mouth Daily As Needed (daily as needed for HTN). 90 tablet 2   • levothyroxine (SYNTHROID, LEVOTHROID) 75 MCG tablet Take 75 mcg by mouth Daily.     • loratadine (CLARITIN) 10 MG tablet Take 10 mg by mouth Daily As Needed for Allergies.     • LORazepam (ATIVAN) 0.5 MG tablet 1/2 tablet daily prn  Taking twice a day for 2 weeks       No current facility-administered medications for this visit.       Albuterol, Edarbyclor [azilsartan-chlorthalidone], Flagyl [metronidazole], Lopid [gemfibrozil], Minoxidil, Sulfa antibiotics, Avapro [irbesartan], Beconase [beclomethasone], Benicar [olmesartan], Buspar [buspirone], Clarithromycin, Clonidine derivatives, Coreg [carvedilol], Cozaar [losartan potassium], Diltiazem, Donnatal [phenobarbital-belladonna alk], Penicillins, Toprol xl [metoprolol tartrate], and Zestril [lisinopril]    Past Medical History:   Diagnosis Date   • Anxiety    • Anxiety    • Esophageal reflux    • H/O: hysterectomy    • History of colonoscopy  "2021   • History of COVID-19    • History of esophagogastroduodenoscopy (EGD) 2021   • Hypercholesterolemia    • Hypertension    • Hypothyroidism    • Osteoporosis    • Scoliosis    • TB (tuberculosis)     Treated a year ago for TB       Social History     Socioeconomic History   • Marital status:    Tobacco Use   • Smoking status: Former     Types: Cigarettes     Quit date: 1966     Years since quittin.0   • Smokeless tobacco: Never   • Tobacco comments:     smoked for maybe 5 years as a teenager..    Vaping Use   • Vaping Use: Never used   Substance and Sexual Activity   • Alcohol use: No   • Drug use: No       Family History   Problem Relation Age of Onset   • Diabetes Mother    • Heart disease Mother    • Heart disease Father    • Diabetes Other         Diabetes   • Heart disease Other    • Stroke Other    • Other Other         Siblings: CABG; Cardiac stenting       Review of Systems   Constitutional: Negative for malaise/fatigue and night sweats.   Cardiovascular: Negative for chest pain, claudication, dyspnea on exertion, irregular heartbeat, leg swelling, near-syncope, palpitations and syncope.   Respiratory: Negative for cough, shortness of breath and wheezing.    Musculoskeletal: Positive for stiffness. Negative for back pain and joint pain.   Gastrointestinal: Negative for anorexia, heartburn, nausea and vomiting.   Genitourinary: Negative for dysuria, hematuria, hesitancy and nocturia.   Neurological: Negative for dizziness, headaches and light-headedness.   Psychiatric/Behavioral: Negative for depression and memory loss. The patient is nervous/anxious.            Objective     /82 (BP Location: Left arm, Patient Position: Sitting)   Pulse 60   Ht 154.9 cm (61\")   Wt 46.7 kg (103 lb)   BMI 19.46 kg/m²     Constitutional:       Appearance: Not in distress.   Eyes:      Pupils: Pupils are equal, round, and reactive to light.   HENT:      Nose: Nose normal.   Pulmonary:      " Effort: Pulmonary effort is normal.      Breath sounds: Normal breath sounds.   Cardiovascular:      PMI at left midclavicular line. Normal rate. Regular rhythm.      Murmurs: There is a systolic murmur.   Abdominal:      Palpations: Abdomen is soft.   Musculoskeletal: Normal range of motion.      Cervical back: Normal range of motion and neck supple. Skin:     General: Skin is warm and dry.   Neurological:      Mental Status: Alert.         Procedures         Diagnoses and all orders for this visit:    1. Murmur, cardiac (Primary)    2. Essential hypertension  -     hydroCHLOROthiazide (HYDRODIURIL) 25 MG tablet; Take 0.5 tablets by mouth Daily As Needed (daily as needed for HTN).  Dispense: 90 tablet; Refill: 2    3. Pulmonary hypertension (HCC)    4. Underweight    Other orders  -     aliskiren (TEKTURNA) 300 MG tablet; Take 1 tablet by mouth Daily.  Dispense: 90 tablet; Refill: 2  -     ezetimibe (ZETIA) 10 MG tablet; Take 1 tablet by mouth Daily.  Dispense: 90 tablet; Refill: 2             HTN:  Blood pressure WNL. She has been back on her ativan routinely and she was advised this must be taken as prescribed. She was counseled that everytime she is seen and is back on her anxiety meds, her BP is perfect. She reports understanding. She will continue her tekturna and HCTZ as needed as well as her clonidine. (assurance given on status)     Anxiety:  Now taking her ativan routinely. BP is at goal. She was urged to continue this as prescribed.      Murmur/SOA/pulm HTN:  Echo 2022 showed EF stable as well as valve areas, RVSP increased to 67mmHG on recent echo, tried PCSK9 for management, she could not tolerate. Given no increase in SOA or no SOA reported, no additional medication will be added.      Hyperlipidemia:  Now on zetia per PCP. FLP monitored by PCP office. Limited carb diet urged.     Hypothyroid:  Managed with synthroid therapy. TSH with your office, heart rate well managed,no arrhythmia noted. Can we  have recent for review?     BMI stable at 19.46, continued cardiac diet with adequate protein and limited sodium advised.  Protein supplementation urged.     Refills sent     6 month follow up recommended or sooner if needed.        Problems Addressed this Visit        Cardiac and Vasculature    Essential hypertension    Relevant Medications    aliskiren (TEKTURNA) 300 MG tablet    hydroCHLOROthiazide (HYDRODIURIL) 25 MG tablet    Murmur, cardiac - Primary       Pulmonary and Pneumonias    Pulmonary hypertension (HCC)   Other Visit Diagnoses     Underweight          Diagnoses       Codes Comments    Murmur, cardiac    -  Primary ICD-10-CM: R01.1  ICD-9-CM: 785.2     Essential hypertension     ICD-10-CM: I10  ICD-9-CM: 401.9     Pulmonary hypertension (HCC)     ICD-10-CM: I27.20  ICD-9-CM: 416.8     Underweight     ICD-10-CM: R63.6  ICD-9-CM: 783.22                 Electronically signed by EMIL White December 8, 2022 10:37 EST

## 2023-06-14 ENCOUNTER — OFFICE VISIT (OUTPATIENT)
Dept: CARDIOLOGY | Facility: CLINIC | Age: 78
End: 2023-06-14
Payer: MEDICARE

## 2023-06-14 VITALS
BODY MASS INDEX: 19.56 KG/M2 | SYSTOLIC BLOOD PRESSURE: 170 MMHG | DIASTOLIC BLOOD PRESSURE: 82 MMHG | HEIGHT: 61 IN | HEART RATE: 60 BPM | WEIGHT: 103.6 LBS

## 2023-06-14 DIAGNOSIS — F41.9 ANXIETY: ICD-10-CM

## 2023-06-14 DIAGNOSIS — I10 ESSENTIAL HYPERTENSION: ICD-10-CM

## 2023-06-14 DIAGNOSIS — E03.9 HYPOTHYROIDISM (ACQUIRED): ICD-10-CM

## 2023-06-14 DIAGNOSIS — I27.20 PULMONARY HYPERTENSION: ICD-10-CM

## 2023-06-14 DIAGNOSIS — R00.2 PALPITATIONS: Primary | ICD-10-CM

## 2023-06-14 PROCEDURE — 3079F DIAST BP 80-89 MM HG: CPT | Performed by: NURSE PRACTITIONER

## 2023-06-14 PROCEDURE — 3077F SYST BP >= 140 MM HG: CPT | Performed by: NURSE PRACTITIONER

## 2023-06-14 PROCEDURE — 99214 OFFICE O/P EST MOD 30 MIN: CPT | Performed by: NURSE PRACTITIONER

## 2023-06-14 RX ORDER — ALISKIREN 300 MG/1
300 TABLET, FILM COATED ORAL DAILY
Qty: 90 TABLET | Refills: 2 | Status: SHIPPED | OUTPATIENT
Start: 2023-06-14

## 2023-06-14 RX ORDER — HYDROCHLOROTHIAZIDE 25 MG/1
12.5 TABLET ORAL DAILY PRN
Qty: 90 TABLET | Refills: 2 | Status: SHIPPED | OUTPATIENT
Start: 2023-06-14

## 2023-06-14 NOTE — ACP (ADVANCE CARE PLANNING)
Advance Care Planning   ACP discussion was declined by the patient. Patient does not have an advance directive, declines further assistance.

## 2023-06-14 NOTE — PROGRESS NOTES
Chief Complaint   Patient presents with    Follow-up     Pt is here for cardiac follow up.  Pt states she has been having some issues with her eyes.  She is currently doing injections in her eyes.  She was unable to tolerate sildenafil.   Pt states she has been having palpitations.  She denies CP, dizziness or SOB.  She does not take a daily ASA.    Med Refill     Pt request 90 day refills to be sent to [facility].  Medications were verified by the pt.      Lab Work     Pt states their last labs were about a month ago with her PCP.         Cardiac Complaints  palpitations      Subjective   Maryellen Fernando is a 78 y.o. female with significant HTN, hypothyroidism, anxiety, mitral regurgitation, and hyperlipidemia whose cardiac workup showed normal coronaries and moderate renal artery stenosis. Most recent echo in 2016 showed normal LV function and mod MR.  She is allergic to almost every medication except Tekturna.  She also takes low dose of HCTZ as needed for elevated bp. She is quite anxious and patient reports ativan brings her blood pressure down as well. Echo repeated April 2022 which showed normal EF and MR and AI remained mild. PA pressure elevated, pulmonary referral advised.Echo repeated in October 2022 showed RVSP at 69mmHg, revatio started, but she could not tolerate due to side effects. She was urged to use ativan by PCP before last visit as her BP was increasing.    She comes today for follow up and reports she has been getting injections in her eyes from pressure built up. She does report palpitations, but denies any CP, dizziness, SOA. Labs have remained followed by PCP, checked a month ago, no current available. Refills requested.          Cardiac History  Past Surgical History:   Procedure Laterality Date    CARDIAC CATHETERIZATION  03/20/2014    Cath-Normal Coronaries. (L) renal artery-60%.    CARDIOVASCULAR STRESS TEST  02/20/2014    Stress-(Mod) 9Min, 71%THR. R/O Inferior Ischemia.    ECHO -  CONVERTED  02/20/2014    Echo-EF 65%. Inferior WMA.    ECHO - CONVERTED  12/20/2016    EF 60%, mild to mod MR, diastolic dysfunction    ECHO - CONVERTED  07/15/2019    @ [facility]. EF 65%. Mild MR & AI. RVSP- 35 mmHg    ECHO - CONVERTED  04/13/2022    EF 60%. Mild MR. RVSP- 63 mmHg    ECHO - CONVERTED  10/10/2022    EF 60%,  Mild MR. RVSP- 69 mmHg    HYSTERECTOMY         Current Outpatient Medications   Medication Sig Dispense Refill    aliskiren (TEKTURNA) 300 MG tablet Take 1 tablet by mouth Daily. 90 tablet 2    cloNIDine (Catapres) 0.1 MG tablet Take twice a day as needed for BP management after meds, SBP >160 DBP>90 180 tablet 2    ezetimibe (ZETIA) 10 MG tablet Take 1 tablet by mouth Daily. 90 tablet 2    hydroCHLOROthiazide (HYDRODIURIL) 25 MG tablet Take 0.5 tablets by mouth Daily As Needed (daily as needed for HTN). 90 tablet 2    levothyroxine (SYNTHROID, LEVOTHROID) 75 MCG tablet Take 1 tablet by mouth Daily.      loratadine (CLARITIN) 10 MG tablet Take 1 tablet by mouth Daily As Needed for Allergies.      LORazepam (ATIVAN) 0.5 MG tablet 1/2 tablet daily prn  Taking twice a day for 2 weeks       No current facility-administered medications for this visit.       Albuterol, Edarbyclor [azilsartan-chlorthalidone], Flagyl [metronidazole], Lopid [gemfibrozil], Minoxidil, Sulfa antibiotics, Avapro [irbesartan], Beconase [beclomethasone], Benicar [olmesartan], Buspar [buspirone], Clarithromycin, Clonidine derivatives, Coreg [carvedilol], Cozaar [losartan potassium], Diltiazem, Donnatal [phenobarbital-belladonna alk], Penicillins, Toprol xl [metoprolol tartrate], and Zestril [lisinopril]    Past Medical History:   Diagnosis Date    Anxiety     Anxiety     Esophageal reflux     H/O: hysterectomy     History of colonoscopy 12/2021    History of COVID-19     History of esophagogastroduodenoscopy (EGD) 12/2021    Hypercholesterolemia     Hypertension     Hypothyroidism     Osteoporosis     Scoliosis     TB (tuberculosis)   "   Treated a year ago for TB       Social History     Socioeconomic History    Marital status:    Tobacco Use    Smoking status: Former     Types: Cigarettes     Quit date: 1966     Years since quittin.5    Smokeless tobacco: Never    Tobacco comments:     smoked for maybe 5 years as a teenager..    Vaping Use    Vaping Use: Never used   Substance and Sexual Activity    Alcohol use: No    Drug use: No       Family History   Problem Relation Age of Onset    Diabetes Mother     Heart disease Mother     Heart disease Father     Diabetes Other         Diabetes    Heart disease Other     Stroke Other     Other Other         Siblings: CABG; Cardiac stenting       Review of Systems   Constitutional: Negative for malaise/fatigue and night sweats.   Cardiovascular:  Negative for chest pain, claudication, dyspnea on exertion, irregular heartbeat, leg swelling, near-syncope, orthopnea, palpitations and syncope.   Respiratory:  Negative for cough, shortness of breath and wheezing.    Musculoskeletal:  Positive for stiffness. Negative for joint pain.   Gastrointestinal:  Negative for anorexia, heartburn, nausea and vomiting.   Genitourinary:  Negative for dysuria, hematuria, hesitancy and nocturia.   Neurological:  Negative for dizziness, headaches and light-headedness.   Psychiatric/Behavioral:  Negative for depression and memory loss. The patient is nervous/anxious.          Objective     /82 (BP Location: Left arm, Patient Position: Sitting)   Pulse 60   Ht 154.9 cm (61\")   Wt 47 kg (103 lb 9.6 oz)   BMI 19.58 kg/m²     Constitutional:       Appearance: Not in distress.   Eyes:      Pupils: Pupils are equal, round, and reactive to light.   HENT:      Nose: Nose normal.   Pulmonary:      Effort: Pulmonary effort is normal.      Breath sounds: Normal breath sounds.   Cardiovascular:      PMI at left midclavicular line. Normal rate. Regular rhythm.      Murmurs: There is a systolic murmur. "   Abdominal:      Palpations: Abdomen is soft.   Musculoskeletal: Normal range of motion.      Cervical back: Normal range of motion and neck supple. Skin:     General: Skin is warm and dry.   Neurological:      Mental Status: Alert.       Procedures         Diagnoses and all orders for this visit:    1. Palpitations (Primary)    2. Essential hypertension  -     hydroCHLOROthiazide (HYDRODIURIL) 25 MG tablet; Take 0.5 tablets by mouth Daily As Needed (daily as needed for HTN).  Dispense: 90 tablet; Refill: 2    3. Pulmonary hypertension    4. Anxiety    5. Hypothyroidism (acquired)    Other orders  -     aliskiren (TEKTURNA) 300 MG tablet; Take 1 tablet by mouth Daily.  Dispense: 90 tablet; Refill: 2             HTN:  Blood pressure elevated. Long discussion with her on adherence of anxiety control as well as BP med management. She reports understanding. She will continue her tekturna and add HCTZ back to daily use.     Anxiety: Using ativan. Encouraged on daily use.     Murmur/SOA/pulm HTN:  Echo 2022 showed EF stable as well as valve areas, RVSP increased to 67mmHG on echo, tried revatio for management, she could not tolerate. Given no increase in SOA or no SOA reported, no additional medication will be added.      Hyperlipidemia: She is taking her zetia daily. FLP with your office. She can not tolerate statin therapy.     Hypothyroid:  Managed with synthroid therapy. TSH with your office, heart rate well managed,no arrhythmia noted. Can we have recent for review?     BMI stable at 19.58, continued cardiac diet with adequate protein and limited sodium advised.  Protein supplementation urged.     Refills sent     6 month follow up recommended or sooner if needed.           Problems Addressed this Visit          Cardiac and Vasculature    Essential hypertension    Relevant Medications    aliskiren (TEKTURNA) 300 MG tablet    hydroCHLOROthiazide (HYDRODIURIL) 25 MG tablet       Pulmonary and Pneumonias    Pulmonary  hypertension     Other Visit Diagnoses       Palpitations    -  Primary    Anxiety        Hypothyroidism (acquired)              Diagnoses         Codes Comments    Palpitations    -  Primary ICD-10-CM: R00.2  ICD-9-CM: 785.1     Essential hypertension     ICD-10-CM: I10  ICD-9-CM: 401.9     Pulmonary hypertension     ICD-10-CM: I27.20  ICD-9-CM: 416.8     Anxiety     ICD-10-CM: F41.9  ICD-9-CM: 300.00     Hypothyroidism (acquired)     ICD-10-CM: E03.9  ICD-9-CM: 244.9             BMI is within normal parameters. No other follow-up for BMI required.              Electronically signed by Roselyn Leo, APRN June 14, 2023 13:32 EDT

## 2023-09-25 RX ORDER — EZETIMIBE 10 MG/1
TABLET ORAL
Qty: 90 TABLET | Refills: 2 | Status: SHIPPED | OUTPATIENT
Start: 2023-09-25

## 2024-01-04 ENCOUNTER — OFFICE VISIT (OUTPATIENT)
Dept: CARDIOLOGY | Facility: CLINIC | Age: 79
End: 2024-01-04
Payer: MEDICARE

## 2024-01-04 VITALS
HEART RATE: 64 BPM | BODY MASS INDEX: 18.92 KG/M2 | DIASTOLIC BLOOD PRESSURE: 90 MMHG | SYSTOLIC BLOOD PRESSURE: 148 MMHG | WEIGHT: 100.2 LBS | HEIGHT: 61 IN

## 2024-01-04 DIAGNOSIS — R63.6 UNDERWEIGHT: ICD-10-CM

## 2024-01-04 DIAGNOSIS — R01.1 MURMUR, CARDIAC: Primary | ICD-10-CM

## 2024-01-04 DIAGNOSIS — R06.02 SHORTNESS OF BREATH: ICD-10-CM

## 2024-01-04 DIAGNOSIS — I27.20 PULMONARY HYPERTENSION: ICD-10-CM

## 2024-01-04 DIAGNOSIS — I10 ESSENTIAL HYPERTENSION: ICD-10-CM

## 2024-01-04 RX ORDER — CLONIDINE HYDROCHLORIDE 0.1 MG/1
TABLET ORAL
Qty: 180 TABLET | Refills: 2 | Status: SHIPPED | OUTPATIENT
Start: 2024-01-04

## 2024-01-04 RX ORDER — ALISKIREN 300 MG/1
300 TABLET, FILM COATED ORAL DAILY
Qty: 90 TABLET | Refills: 2 | Status: SHIPPED | OUTPATIENT
Start: 2024-01-04

## 2024-01-04 RX ORDER — EZETIMIBE 10 MG/1
10 TABLET ORAL DAILY
Qty: 90 TABLET | Refills: 2 | Status: SHIPPED | OUTPATIENT
Start: 2024-01-04

## 2024-01-04 RX ORDER — MELATONIN
1000 DAILY
COMMUNITY

## 2024-01-04 RX ORDER — HYDROCHLOROTHIAZIDE 25 MG/1
12.5 TABLET ORAL DAILY PRN
Qty: 90 TABLET | Refills: 2 | Status: SHIPPED | OUTPATIENT
Start: 2024-01-04

## 2024-01-04 NOTE — PROGRESS NOTES
Chief Complaint   Patient presents with    Follow-up     Pt is here for cardiac follow up.  Pt states she has had some SOB.  She thinks it was related to her trying to take AREDS2.   She states that when she stopped taking AREDS2, that her SOB has been better.  She denies CP, dizziness or palpitations.  She state she had COVID again about 2 months ago and lost a lot of wt. Pt does not take a daily ASA.      Med Refill     Pt request 90 day refills to be sent to [facility].  Medications were verified by the pt.      Lab Work     Pt states their last labs were a few weeks ago with her PCP.         Cardiac Complaints  dyspnea      Subjective   Maryellen Fernando is a 78 y.o. female with significant HTN, hypothyroidism, anxiety, mitral regurgitation, and hyperlipidemia whose cardiac workup showed normal coronaries and moderate renal artery stenosis. Most recent echo in 2016 showed normal LV function and mod MR.  She is allergic to almost every medication except Tekturna.  She also takes low dose of HCTZ as needed for elevated bp. She is quite anxious and patient reports ativan brings her blood pressure down as well. Echo repeated April 2022 which showed normal EF and MR and AI remained mild. PA pressure elevated, pulmonary referral advised.Echo repeated in October 2022 showed RVSP at 69mmHg, revatio started, but she could not tolerate due to side effects. She was urged to use ativan by PCP before last visit as her BP was increasing. Last visit, HCTZ encouraged daily for better BP control.     She comes today for follow up and admits to some SOA, however she reports it has improved. She states she tried to take AREDS and it worsened with this. Since she has stopped the AREDs the SOA has slightly improved. She reports she was sick with COVID a couple months ago and lost a lot of weight at that time.            Cardiac History  Past Surgical History:   Procedure Laterality Date    CARDIAC CATHETERIZATION  03/20/2014     Cath-Normal Coronaries. (L) renal artery-60%.    CARDIOVASCULAR STRESS TEST  02/20/2014    Stress-(Mod) 9Min, 71%THR. R/O Inferior Ischemia.    ECHO - CONVERTED  02/20/2014    Echo-EF 65%. Inferior WMA.    ECHO - CONVERTED  12/20/2016    EF 60%, mild to mod MR, diastolic dysfunction    ECHO - CONVERTED  07/15/2019    @ [facility]. EF 65%. Mild MR & AI. RVSP- 35 mmHg    ECHO - CONVERTED  04/13/2022    EF 60%. Mild MR. RVSP- 63 mmHg    ECHO - CONVERTED  10/10/2022    EF 60%,  Mild MR. RVSP- 69 mmHg    HYSTERECTOMY         Current Outpatient Medications   Medication Sig Dispense Refill    aliskiren (TEKTURNA) 300 MG tablet Take 1 tablet by mouth Daily. 90 tablet 2    cholecalciferol (Vitamin D, Cholecalciferol,) 25 MCG (1000 UT) tablet Take 1 tablet by mouth Daily.      cloNIDine (Catapres) 0.1 MG tablet Take twice a day as needed for BP management after meds, SBP >160 DBP>90 180 tablet 2    Cyanocobalamin (VITAMIN B-12 IJ) Inject  as directed Every 30 (Thirty) Days.      ezetimibe (ZETIA) 10 MG tablet Take 1 tablet by mouth Daily. 90 tablet 2    hydroCHLOROthiazide (HYDRODIURIL) 25 MG tablet Take 0.5 tablets by mouth Daily As Needed (daily as needed for HTN). 90 tablet 2    levothyroxine (SYNTHROID, LEVOTHROID) 75 MCG tablet Take 1 tablet by mouth Daily.      loratadine (CLARITIN) 10 MG tablet Take 1 tablet by mouth Daily As Needed for Allergies.      LORazepam (ATIVAN) 0.5 MG tablet 1/2 tablet daily prn  Taking twice a day for 2 weeks       No current facility-administered medications for this visit.       Albuterol, Edarbyclor [azilsartan-chlorthalidone], Flagyl [metronidazole], Lopid [gemfibrozil], Minoxidil, Sulfa antibiotics, Avapro [irbesartan], Beconase [beclomethasone], Benicar [olmesartan], Buspar [buspirone], Clarithromycin, Clonidine derivatives, Coreg [carvedilol], Cozaar [losartan potassium], Diltiazem, Donnatal [phenobarbital-belladonna alk], Penicillins, Toprol xl [metoprolol tartrate], and Zestril  "[lisinopril]    Past Medical History:   Diagnosis Date    Anxiety     Anxiety     Esophageal reflux     H/O: hysterectomy     History of colonoscopy 2021    History of COVID-19     History of esophagogastroduodenoscopy (EGD) 2021    Hypercholesterolemia     Hypertension     Hypothyroidism     Osteoporosis     Scoliosis     TB (tuberculosis)     Treated a year ago for TB       Social History     Socioeconomic History    Marital status:    Tobacco Use    Smoking status: Former     Types: Cigarettes     Quit date: 1966     Years since quittin.0    Smokeless tobacco: Never    Tobacco comments:     smoked for maybe 5 years as a teenager..    Vaping Use    Vaping Use: Never used   Substance and Sexual Activity    Alcohol use: No    Drug use: No       Family History   Problem Relation Age of Onset    Diabetes Mother     Heart disease Mother     Heart disease Father     Diabetes Other         Diabetes    Heart disease Other     Stroke Other     Other Other         Siblings: CABG; Cardiac stenting       Review of Systems   Constitutional: Negative for malaise/fatigue and night sweats.   Cardiovascular:  Negative for chest pain, claudication, dyspnea on exertion, irregular heartbeat, leg swelling, near-syncope, orthopnea, palpitations and syncope.   Respiratory:  Positive for shortness of breath. Negative for cough and wheezing.    Musculoskeletal:  Negative for back pain, joint pain and stiffness.   Gastrointestinal:  Negative for anorexia, heartburn, nausea and vomiting.   Genitourinary:  Negative for dysuria, hematuria, hesitancy and nocturia.   Neurological:  Negative for dizziness, headaches, light-headedness and loss of balance.   Psychiatric/Behavioral:  Negative for depression and memory loss. The patient is not nervous/anxious.            Objective     /90 (BP Location: Left arm, Patient Position: Sitting)   Pulse 64   Ht 154.9 cm (61\")   Wt 45.5 kg (100 lb 3.2 oz)   BMI 18.93 kg/m² "     Physical Exam    Procedures         Diagnoses and all orders for this visit:    1. Murmur, cardiac (Primary)    2. Essential hypertension  -     hydroCHLOROthiazide (HYDRODIURIL) 25 MG tablet; Take 0.5 tablets by mouth Daily As Needed (daily as needed for HTN).  Dispense: 90 tablet; Refill: 2    3. Shortness of breath    4. Pulmonary hypertension    5. Underweight    Other orders  -     aliskiren (TEKTURNA) 300 MG tablet; Take 1 tablet by mouth Daily.  Dispense: 90 tablet; Refill: 2  -     cloNIDine (Catapres) 0.1 MG tablet; Take twice a day as needed for BP management after meds, SBP >160 DBP>90  Dispense: 180 tablet; Refill: 2  -     ezetimibe (ZETIA) 10 MG tablet; Take 1 tablet by mouth Daily.  Dispense: 90 tablet; Refill: 2             HTN:  Blood pressure elevated slightly. According to patient, it has been very well controlled at home. For now, we will continue with current tekturna and HCTZ therapy. Will continue meds for anxiety management. Using clonidine as needed for HTN management.     Anxiety: Using ativan. Advised to use daily.     Murmur/SOA/pulm HTN:  Echo 2022 showed EF stable as well as valve areas, RVSP increased to 67mmHG on echo, tried revatio for management, she could not tolerate. SOA noted, but she reports improved after stopping her AREDS. Will consider repeat echo at next visit.     Hyperlipidemia: She is taking her zetia daily. FLP with your office. She can not tolerate statin therapy.     Hypothyroid:  Managed with synthroid therapy. TSH with your office, heart rate well managed,no arrhythmia noted. Can we have recent for review?     BMI stable at 18.98, continued cardiac diet with adequate protein and limited sodium advised.  Protein supplementation urged. Was urged to discuss BOOST samples with your office.     Refills sent     6 month follow up recommended or sooner if needed.         Problems Addressed this Visit          Cardiac and Vasculature    Essential hypertension     Relevant Medications    aliskiren (TEKTURNA) 300 MG tablet    cloNIDine (Catapres) 0.1 MG tablet    hydroCHLOROthiazide (HYDRODIURIL) 25 MG tablet    Murmur, cardiac - Primary       Pulmonary and Pneumonias    Pulmonary hypertension     Other Visit Diagnoses       Shortness of breath        Underweight              Diagnoses         Codes Comments    Murmur, cardiac    -  Primary ICD-10-CM: R01.1  ICD-9-CM: 785.2     Essential hypertension     ICD-10-CM: I10  ICD-9-CM: 401.9     Shortness of breath     ICD-10-CM: R06.02  ICD-9-CM: 786.05     Pulmonary hypertension     ICD-10-CM: I27.20  ICD-9-CM: 416.8     Underweight     ICD-10-CM: R63.6  ICD-9-CM: 783.22                       Electronically signed by EMIL White January 4, 2024 17:13 EST

## 2024-07-08 ENCOUNTER — OFFICE VISIT (OUTPATIENT)
Dept: CARDIOLOGY | Facility: CLINIC | Age: 79
End: 2024-07-08
Payer: MEDICARE

## 2024-07-08 VITALS
HEIGHT: 61 IN | HEART RATE: 62 BPM | WEIGHT: 100 LBS | SYSTOLIC BLOOD PRESSURE: 160 MMHG | DIASTOLIC BLOOD PRESSURE: 92 MMHG | BODY MASS INDEX: 18.88 KG/M2

## 2024-07-08 DIAGNOSIS — I10 ESSENTIAL HYPERTENSION: Primary | ICD-10-CM

## 2024-07-08 DIAGNOSIS — F41.9 ANXIETY: ICD-10-CM

## 2024-07-08 DIAGNOSIS — R00.2 PALPITATIONS: ICD-10-CM

## 2024-07-08 DIAGNOSIS — E78.2 MIXED HYPERLIPIDEMIA: ICD-10-CM

## 2024-07-08 DIAGNOSIS — I27.20 PULMONARY HYPERTENSION: ICD-10-CM

## 2024-07-08 PROCEDURE — 3077F SYST BP >= 140 MM HG: CPT | Performed by: NURSE PRACTITIONER

## 2024-07-08 PROCEDURE — 99214 OFFICE O/P EST MOD 30 MIN: CPT | Performed by: NURSE PRACTITIONER

## 2024-07-08 PROCEDURE — 3080F DIAST BP >= 90 MM HG: CPT | Performed by: NURSE PRACTITIONER

## 2024-07-08 RX ORDER — ALISKIREN 300 MG/1
300 TABLET, FILM COATED ORAL DAILY
Qty: 90 TABLET | Refills: 2 | Status: SHIPPED | OUTPATIENT
Start: 2024-07-08

## 2024-07-08 RX ORDER — HYDROCHLOROTHIAZIDE 25 MG/1
12.5 TABLET ORAL DAILY PRN
Qty: 90 TABLET | Refills: 2 | Status: SHIPPED | OUTPATIENT
Start: 2024-07-08

## 2024-07-08 RX ORDER — EZETIMIBE 10 MG/1
10 TABLET ORAL DAILY
Qty: 90 TABLET | Refills: 2 | Status: SHIPPED | OUTPATIENT
Start: 2024-07-08

## 2024-07-08 RX ORDER — CLONIDINE HYDROCHLORIDE 0.1 MG/1
TABLET ORAL
Qty: 180 TABLET | Refills: 2 | Status: SHIPPED | OUTPATIENT
Start: 2024-07-08

## 2024-07-08 NOTE — PROGRESS NOTES
Chief Complaint   Patient presents with    Follow-up     Pt is here for cardiac follow up.  Pt states she has been having more frequent palpitations.  She denies CP, dizziness or SOB.  Pt does not take a daily ASA.      Med Refill     Pt request 90 day refills to be sent to [facility].  Medications were verified by the pt.      Lab Work     Pt states their last labs were about 6 months ago with her PCP.         Cardiac Complaints  palpitations      Subjective   Maryellen Fernando is a 79 y.o. female with significant HTN, hypothyroidism, anxiety, mitral regurgitation, and hyperlipidemia whose cardiac workup showed normal coronaries and moderate renal artery stenosis. Most recent echo in 2016 showed normal LV function and mod MR.  She is allergic to almost every medication except Tekturna.  She also takes low dose of HCTZ as needed for elevated bp. She is quite anxious and patient reports ativan brings her blood pressure down as well. Echo repeated April 2022 which showed normal EF and MR and AI remained mild. PA pressure elevated, pulmonary referral advised.Echo repeated in October 2022 showed RVSP at 69mmHg, revatio started, but she could not tolerate due to side effects. She was urged to use ativan by PCP before last visit as her BP was increasing. Last visit, HCTZ encouraged daily for better BP control.     Patient comes today for follow up and admits to palpitations. She reports the palpitations have been worse frequently, admits she has not been drinking enough and has not had labs checked. No dizziness, SOA, or syncope noted. She does admit anxiety has been better controlled, still taking ativan therapy. She does admit labs with PCP, checked 6 months ago. Refills requested.            Cardiac History  Past Surgical History:   Procedure Laterality Date    CARDIAC CATHETERIZATION  03/20/2014    Cath-Normal Coronaries. (L) renal artery-60%.    CARDIOVASCULAR STRESS TEST  02/20/2014    Stress-(Mod) 9Min, 71%THR.  R/O Inferior Ischemia.    ECHO - CONVERTED  02/20/2014    Echo-EF 65%. Inferior WMA.    ECHO - CONVERTED  12/20/2016    EF 60%, mild to mod MR, diastolic dysfunction    ECHO - CONVERTED  07/15/2019    @ [facility]. EF 65%. Mild MR & AI. RVSP- 35 mmHg    ECHO - CONVERTED  04/13/2022    EF 60%. Mild MR. RVSP- 63 mmHg    ECHO - CONVERTED  10/10/2022    EF 60%,  Mild MR. RVSP- 69 mmHg    HYSTERECTOMY         Current Outpatient Medications   Medication Sig Dispense Refill    aliskiren (TEKTURNA) 300 MG tablet Take 1 tablet by mouth Daily. 90 tablet 2    cloNIDine (Catapres) 0.1 MG tablet Take twice a day as needed for BP management after meds, SBP >160 DBP>90 180 tablet 2    Cyanocobalamin (VITAMIN B-12 IJ) Inject  as directed Every 30 (Thirty) Days.      ezetimibe (ZETIA) 10 MG tablet Take 1 tablet by mouth Daily. 90 tablet 2    hydroCHLOROthiazide 25 MG tablet Take 0.5 tablets by mouth Daily As Needed (daily as needed for HTN). 90 tablet 2    levothyroxine (SYNTHROID, LEVOTHROID) 75 MCG tablet Take 1 tablet by mouth Daily.      loratadine (CLARITIN) 10 MG tablet Take 1 tablet by mouth Daily As Needed for Allergies.      LORazepam (ATIVAN) 0.5 MG tablet 1/2 tablet daily prn  Taking twice a day for 2 weeks       No current facility-administered medications for this visit.       Albuterol, Edarbyclor [azilsartan-chlorthalidone], Flagyl [metronidazole], Lopid [gemfibrozil], Minoxidil, Sulfa antibiotics, Avapro [irbesartan], Beconase [beclomethasone], Benicar [olmesartan], Buspar [buspirone], Clarithromycin, Clonidine derivatives, Coreg [carvedilol], Cozaar [losartan potassium], Diltiazem, Donnatal [phenobarbital-belladonna alk], Penicillins, Toprol xl [metoprolol tartrate], and Zestril [lisinopril]    Past Medical History:   Diagnosis Date    Anxiety     Anxiety     Esophageal reflux     H/O: hysterectomy     History of colonoscopy 12/2021    History of COVID-19     History of esophagogastroduodenoscopy (EGD) 12/2021     "Hypercholesterolemia     Hypertension     Hypothyroidism     Osteoporosis     Scoliosis     TB (tuberculosis)     Treated a year ago for TB       Social History     Socioeconomic History    Marital status:    Tobacco Use    Smoking status: Former     Current packs/day: 0.00     Types: Cigarettes     Quit date: 1966     Years since quittin.6    Smokeless tobacco: Never    Tobacco comments:     smoked for maybe 5 years as a teenager..    Vaping Use    Vaping status: Never Used   Substance and Sexual Activity    Alcohol use: No    Drug use: No       Family History   Problem Relation Age of Onset    Diabetes Mother     Heart disease Mother     Heart disease Father     Diabetes Other         Diabetes    Heart disease Other     Stroke Other     Other Other         Siblings: CABG; Cardiac stenting       Review of Systems   Constitutional: Negative for malaise/fatigue and night sweats.   Cardiovascular:  Positive for palpitations. Negative for chest pain, claudication, dyspnea on exertion, irregular heartbeat, leg swelling, near-syncope, orthopnea and syncope.   Respiratory:  Negative for cough, shortness of breath and wheezing.    Musculoskeletal:  Negative for back pain, joint pain and stiffness.   Gastrointestinal:  Negative for anorexia, heartburn, nausea and vomiting.   Genitourinary:  Negative for dysuria, hematuria, hesitancy and nocturia.   Neurological:  Negative for dizziness, light-headedness and loss of balance.   Psychiatric/Behavioral:  Negative for depression and memory loss. The patient is not nervous/anxious.            Objective     /92 (BP Location: Left arm, Patient Position: Sitting)   Pulse 62   Ht 154.9 cm (61\")   Wt 45.4 kg (100 lb)   BMI 18.89 kg/m²     Constitutional:       Appearance: Not in distress.   Eyes:      Pupils: Pupils are equal, round, and reactive to light.   HENT:      Nose: Nose normal.   Pulmonary:      Effort: Pulmonary effort is normal.      Breath " sounds: Normal breath sounds.   Cardiovascular:      PMI at left midclavicular line. Normal rate. Regular rhythm.      Murmurs: There is a systolic murmur.   Abdominal:      Palpations: Abdomen is soft.   Musculoskeletal: Normal range of motion.      Cervical back: Normal range of motion and neck supple. Skin:     General: Skin is warm and dry.   Neurological:      Mental Status: Alert.         Procedures         Diagnoses and all orders for this visit:    1. Essential hypertension (Primary)  -     hydroCHLOROthiazide 25 MG tablet; Take 0.5 tablets by mouth Daily As Needed (daily as needed for HTN).  Dispense: 90 tablet; Refill: 2    2. Pulmonary hypertension  -     Adult Transthoracic Echo Complete W/ Cont if Necessary Per Protocol; Future    3. Palpitations  -     Adult Transthoracic Echo Complete W/ Cont if Necessary Per Protocol; Future    4. Mixed hyperlipidemia    5. Anxiety    Other orders  -     aliskiren (TEKTURNA) 300 MG tablet; Take 1 tablet by mouth Daily.  Dispense: 90 tablet; Refill: 2  -     cloNIDine (Catapres) 0.1 MG tablet; Take twice a day as needed for BP management after meds, SBP >160 DBP>90  Dispense: 180 tablet; Refill: 2  -     ezetimibe (ZETIA) 10 MG tablet; Take 1 tablet by mouth Daily.  Dispense: 90 tablet; Refill: 2             HTN:  Blood pressure elevated, she thinks it was the drive, according to patient, it has been well managed at home. She is using HCTZ as needed, as she reports it is often well controlled, urged to add daily as was discussed last visit. For now, we will continue with current tekturna and HCTZ therapy (only using as needed, advised to take daily). Will continue meds for anxiety management. Using clonidine as needed for HTN management.     Anxiety: Using ativan. Advised to use daily.     Murmur/SOA/pulm HTN:  Echo 2022 showed EF stable as well as valve areas, RVSP increased to 67mmHG on echo, tried revatio for management, she could not tolerate. Will advise on  repeat check to assess RVSP, SOA has improved.     Hyperlipidemia: She is taking her zetia daily. FLP with your office. She can not tolerate statin therapy.     Hypothyroid/Palpitations:  Managed with synthroid therapy. TSH with your office, will be checked with your office, more palpitations are noted. If TSH okay, water intake increased and palpitations persist, will urge to wear a holter monitor.     BMI stable at 18.98, continued cardiac diet with adequate protein and limited sodium advised.  Protein supplementation urged. Was urged to discuss BOOST samples with your office.     Refills sent     6 month follow up recommended or sooner if needed.               Problems Addressed this Visit          Cardiac and Vasculature    Essential hypertension - Primary    Relevant Medications    aliskiren (TEKTURNA) 300 MG tablet    cloNIDine (Catapres) 0.1 MG tablet    hydroCHLOROthiazide 25 MG tablet       Pulmonary and Pneumonias    Pulmonary hypertension    Relevant Orders    Adult Transthoracic Echo Complete W/ Cont if Necessary Per Protocol     Other Visit Diagnoses       Palpitations        Relevant Orders    Adult Transthoracic Echo Complete W/ Cont if Necessary Per Protocol    Mixed hyperlipidemia        Relevant Medications    ezetimibe (ZETIA) 10 MG tablet    Anxiety              Diagnoses         Codes Comments    Essential hypertension    -  Primary ICD-10-CM: I10  ICD-9-CM: 401.9     Pulmonary hypertension     ICD-10-CM: I27.20  ICD-9-CM: 416.8     Palpitations     ICD-10-CM: R00.2  ICD-9-CM: 785.1     Mixed hyperlipidemia     ICD-10-CM: E78.2  ICD-9-CM: 272.2     Anxiety     ICD-10-CM: F41.9  ICD-9-CM: 300.00                       Electronically signed by EMIL White July 8, 2024 17:09 EDT

## 2024-07-16 ENCOUNTER — HOSPITAL ENCOUNTER (OUTPATIENT)
Dept: CARDIOLOGY | Facility: HOSPITAL | Age: 79
Discharge: HOME | End: 2024-07-16
Admitting: NURSE PRACTITIONER
Payer: MEDICARE

## 2024-07-16 VITALS — WEIGHT: 100.09 LBS | HEIGHT: 61 IN | BODY MASS INDEX: 18.9 KG/M2

## 2024-07-16 DIAGNOSIS — R00.2 PALPITATIONS: ICD-10-CM

## 2024-07-16 DIAGNOSIS — I27.20 PULMONARY HYPERTENSION: ICD-10-CM

## 2024-07-16 LAB
AORTIC DIMENSIONLESS INDEX: 0.82 (DI)
BH CV ECHO MEAS - ACS: 1.33 CM
BH CV ECHO MEAS - AO MAX PG: 8.3 MMHG
BH CV ECHO MEAS - AO MEAN PG: 4.5 MMHG
BH CV ECHO MEAS - AO ROOT DIAM: 2.9 CM
BH CV ECHO MEAS - AO V2 MAX: 144.3 CM/SEC
BH CV ECHO MEAS - AO V2 VTI: 39.5 CM
BH CV ECHO MEAS - EDV(CUBED): 70.8 ML
BH CV ECHO MEAS - EF(MOD-BP): 54 %
BH CV ECHO MEAS - ESV(CUBED): 26.6 ML
BH CV ECHO MEAS - FS: 27.9 %
BH CV ECHO MEAS - IVS/LVPW: 0.9 CM
BH CV ECHO MEAS - IVSD: 0.97 CM
BH CV ECHO MEAS - LA DIMENSION: 3.5 CM
BH CV ECHO MEAS - LAT PEAK E' VEL: 7 CM/SEC
BH CV ECHO MEAS - LV MASS(C)D: 139.5 GRAMS
BH CV ECHO MEAS - LV MAX PG: 4.5 MMHG
BH CV ECHO MEAS - LV MEAN PG: 2.38 MMHG
BH CV ECHO MEAS - LV V1 MAX: 106.4 CM/SEC
BH CV ECHO MEAS - LV V1 VTI: 32.1 CM
BH CV ECHO MEAS - LVIDD: 4.1 CM
BH CV ECHO MEAS - LVIDS: 3 CM
BH CV ECHO MEAS - LVPWD: 1.08 CM
BH CV ECHO MEAS - MED PEAK E' VEL: 4.6 CM/SEC
BH CV ECHO MEAS - MR MAX PG: 73.9 MMHG
BH CV ECHO MEAS - MR MAX VEL: 429.8 CM/SEC
BH CV ECHO MEAS - MV A MAX VEL: 95.4 CM/SEC
BH CV ECHO MEAS - MV DEC SLOPE: 464.7 CM/SEC2
BH CV ECHO MEAS - MV DEC TIME: 0.3 SEC
BH CV ECHO MEAS - MV E MAX VEL: 95.1 CM/SEC
BH CV ECHO MEAS - MV E/A: 1
BH CV ECHO MEAS - MV MAX PG: 5.1 MMHG
BH CV ECHO MEAS - MV MEAN PG: 1.5 MMHG
BH CV ECHO MEAS - MV P1/2T: 78.5 MSEC
BH CV ECHO MEAS - MV V2 VTI: 41 CM
BH CV ECHO MEAS - MVA(P1/2T): 2.8 CM2
BH CV ECHO MEAS - PA V2 MAX: 105.3 CM/SEC
BH CV ECHO MEAS - PI END-D VEL: 97.6 CM/SEC
BH CV ECHO MEAS - RAP SYSTOLE: 8 MMHG
BH CV ECHO MEAS - RV MAX PG: 1.31 MMHG
BH CV ECHO MEAS - RV V1 MAX: 57.3 CM/SEC
BH CV ECHO MEAS - RV V1 VTI: 15.1 CM
BH CV ECHO MEAS - RVSP: 61.3 MMHG
BH CV ECHO MEAS - TAPSE (>1.6): 1.86 CM
BH CV ECHO MEAS - TR MAX PG: 53.3 MMHG
BH CV ECHO MEAS - TR MAX VEL: 365.2 CM/SEC
BH CV ECHO MEASUREMENTS AVERAGE E/E' RATIO: 16.4
BH CV XLRA - TDI S': 7.9 CM/SEC
SINUS: 2.6 CM

## 2024-07-16 PROCEDURE — 93306 TTE W/DOPPLER COMPLETE: CPT | Performed by: INTERNAL MEDICINE

## 2024-07-16 PROCEDURE — 93306 TTE W/DOPPLER COMPLETE: CPT

## 2024-07-17 ENCOUNTER — TELEPHONE (OUTPATIENT)
Dept: CARDIOLOGY | Facility: CLINIC | Age: 79
End: 2024-07-17
Payer: MEDICARE

## 2024-07-17 DIAGNOSIS — I27.20 PULMONARY HYPERTENSION: Primary | ICD-10-CM

## 2024-07-17 NOTE — TELEPHONE ENCOUNTER
----- Message from Bettina HOLM sent at 7/17/2024  3:48 PM EDT -----  Patient was made aware of results of echo. Patient states that she isn't opposed to seeing a specialist for pulmonary HTN, but she states that she would not be able to go to [place]. She states that if there is someone she can see closer to [place], [place], or [place], that she would be willing to see. She was made aware that I would let you know, I did not know of anyone that close, but would see if you did. She was made aware if we were referring, I would call her, but otherwise continue current.

## 2024-07-17 NOTE — TELEPHONE ENCOUNTER
Patient was made aware that referral to EMIL Garcia was made. Referral sent to their department to schedule.

## 2024-07-17 NOTE — PROGRESS NOTES
Valve areas okay, EF normal, RVSP stable compared to prior. We have discussed referral for pulm HTN before, does she want to proceed, continue current?

## 2024-07-17 NOTE — TELEPHONE ENCOUNTER
It looks like she does see Dr. Sommer. Does [facility] Guerrero specialize more in pulmonary HTN or do you want her to get second opinion?

## 2025-01-16 ENCOUNTER — OFFICE VISIT (OUTPATIENT)
Dept: CARDIOLOGY | Facility: CLINIC | Age: 80
End: 2025-01-16
Payer: MEDICARE

## 2025-01-16 VITALS
BODY MASS INDEX: 18.31 KG/M2 | SYSTOLIC BLOOD PRESSURE: 160 MMHG | WEIGHT: 97 LBS | HEART RATE: 59 BPM | DIASTOLIC BLOOD PRESSURE: 94 MMHG | HEIGHT: 61 IN

## 2025-01-16 DIAGNOSIS — R20.2 TINGLING IN EXTREMITIES: ICD-10-CM

## 2025-01-16 DIAGNOSIS — F43.21 GRIEF: ICD-10-CM

## 2025-01-16 DIAGNOSIS — R94.31 ABNORMAL EKG: ICD-10-CM

## 2025-01-16 DIAGNOSIS — I10 UNCONTROLLED HYPERTENSION: ICD-10-CM

## 2025-01-16 DIAGNOSIS — Z91.148 NON COMPLIANCE W MEDICATION REGIMEN: ICD-10-CM

## 2025-01-16 DIAGNOSIS — F41.9 ANXIETY: ICD-10-CM

## 2025-01-16 DIAGNOSIS — I10 ESSENTIAL HYPERTENSION: Primary | ICD-10-CM

## 2025-01-16 DIAGNOSIS — I73.9 PVD (PERIPHERAL VASCULAR DISEASE) WITH CLAUDICATION: ICD-10-CM

## 2025-01-16 DIAGNOSIS — I45.10 RBBB: ICD-10-CM

## 2025-01-16 DIAGNOSIS — R07.89 CHEST PAIN, ATYPICAL: ICD-10-CM

## 2025-01-16 DIAGNOSIS — R06.02 SHORTNESS OF BREATH: ICD-10-CM

## 2025-01-16 PROCEDURE — 99214 OFFICE O/P EST MOD 30 MIN: CPT | Performed by: NURSE PRACTITIONER

## 2025-01-16 PROCEDURE — 93000 ELECTROCARDIOGRAM COMPLETE: CPT | Performed by: NURSE PRACTITIONER

## 2025-01-16 PROCEDURE — 3080F DIAST BP >= 90 MM HG: CPT | Performed by: NURSE PRACTITIONER

## 2025-01-16 PROCEDURE — 3077F SYST BP >= 140 MM HG: CPT | Performed by: NURSE PRACTITIONER

## 2025-01-16 RX ORDER — EZETIMIBE 10 MG/1
10 TABLET ORAL DAILY
Qty: 90 TABLET | Refills: 2 | Status: SHIPPED | OUTPATIENT
Start: 2025-01-16

## 2025-01-16 RX ORDER — ALISKIREN 300 MG/1
300 TABLET, FILM COATED ORAL DAILY
Qty: 90 TABLET | Refills: 2 | Status: SHIPPED | OUTPATIENT
Start: 2025-01-16

## 2025-01-16 RX ORDER — HYDROCHLOROTHIAZIDE 12.5 MG/1
12.5 TABLET ORAL DAILY PRN
Qty: 90 TABLET | Refills: 2 | Status: SHIPPED | OUTPATIENT
Start: 2025-01-16

## 2025-01-16 NOTE — PROGRESS NOTES
Chief Complaint   Patient presents with    Follow-up     For cardiac management. Patient is not on aspirin. Last lab work was done a few months ago per PCP, not in chart. States that she lost her  on 12/28/24. States that she has had some chest pain on her right side and felt like nerve pain. States that she occasionally has palpitations. States that she does have dizziness, relates to vertigo and sinus issues.     Med Refill     Needs refills on cardiac medications. 90 day supplies to [facility]. 30 days on Tekturna. Went over medications verbally.        Cardiac Complaints  chest pressure/discomfort and claudication      Subjective   Maryellen Fernando is a 79 y.o. female with significant HTN, pulm HTN, hypothyroidism, anxiety, mitral regurgitation, and hyperlipidemia whose cardiac workup showed normal coronaries and moderate renal artery stenosis. Most recent echo in 2016 showed normal LV function and mod MR.  She is allergic to almost every medication except Tekturna.  She also takes low dose of HCTZ as needed for elevated bp. She is quite anxious and patient reports ativan brings her blood pressure down as well. Echo repeated April 2022 which showed normal EF and MR and AI remained mild. PA pressure elevated, pulmonary referral advised.Echo repeated in October 2022 showed RVSP at 69mmHg, revatio started, but she could not tolerate due to side effects. She was urged to use ativan by PCP before last visit as her BP was increasing. Last visit, HCTZ encouraged daily for better BP control. Echo 2024 showed RVSP 62mmHg, similar to prior, referral to [facility] Guerrero made.    She comes today for follow up and admits to atypical right sided chest pain. She does state she lost her  on 12/28 and has been having anxiety/grief with this. Rarely she will have palpitations. She does admit to dizziness, but thinks it is sinus related. Labs a few months ago with PCP. Refills requested.            Cardiac  History  Past Surgical History:   Procedure Laterality Date    CARDIAC CATHETERIZATION  03/20/2014    Cath-Normal Coronaries. (L) renal artery-60%.    CARDIOVASCULAR STRESS TEST  02/20/2014    Stress-(Mod) 9Min, 71%THR. R/O Inferior Ischemia.    ECHO - CONVERTED  02/20/2014    Echo-EF 65%. Inferior WMA.    ECHO - CONVERTED  12/20/2016    EF 60%, mild to mod MR, diastolic dysfunction    ECHO - CONVERTED  07/15/2019    @ [facility]. EF 65%. Mild MR & AI. RVSP- 35 mmHg    ECHO - CONVERTED  04/13/2022    EF 60%. Mild MR. RVSP- 63 mmHg    ECHO - CONVERTED  10/10/2022    EF 60%,  Mild MR. RVSP- 69 mmHg    ECHO - CONVERTED  07/16/2024    EF 60%. Trace-Mild AI. Mild-Mod MR. RVSP- 62 mmHg    HYSTERECTOMY         Current Outpatient Medications   Medication Sig Dispense Refill    aliskiren (TEKTURNA) 300 MG tablet Take 1 tablet by mouth Daily. 90 tablet 2    cloNIDine (Catapres) 0.1 MG tablet Take twice a day as needed for BP management after meds, SBP >160 DBP>90 180 tablet 2    Cyanocobalamin (VITAMIN B-12 IJ) Inject  as directed Every 30 (Thirty) Days.      ezetimibe (ZETIA) 10 MG tablet Take 1 tablet by mouth Daily. 90 tablet 2    hydroCHLOROthiazide 12.5 MG tablet Take 1 tablet by mouth Daily As Needed (daily as needed for HTN). 90 tablet 2    levothyroxine (SYNTHROID, LEVOTHROID) 75 MCG tablet Take 1 tablet by mouth Daily.      loratadine (CLARITIN) 10 MG tablet Take 1 tablet by mouth Daily As Needed for Allergies.      LORazepam (ATIVAN) 0.5 MG tablet 1/2 tablet daily prn  Taking twice a day for 2 weeks       No current facility-administered medications for this visit.       Albuterol, Edarbyclor [azilsartan-chlorthalidone], Flagyl [metronidazole], Lopid [gemfibrozil], Minoxidil, Sulfa antibiotics, Avapro [irbesartan], Beconase [beclomethasone], Benicar [olmesartan], Buspar [buspirone], Clarithromycin, Clonidine derivatives, Coreg [carvedilol], Cozaar [losartan potassium], Diltiazem, Donnatal [phenobarbital-belladonna alk],  Penicillins, Toprol xl [metoprolol tartrate], and Zestril [lisinopril]    Past Medical History:   Diagnosis Date    Anxiety     Anxiety     Esophageal reflux     H/O: hysterectomy     History of colonoscopy 2021    History of COVID-19     History of esophagogastroduodenoscopy (EGD) 2021    Hypercholesterolemia     Hypertension     Hypothyroidism     Osteoporosis     Scoliosis     TB (tuberculosis)     Treated a year ago for TB       Social History     Socioeconomic History    Marital status:    Tobacco Use    Smoking status: Former     Current packs/day: 0.00     Types: Cigarettes     Quit date: 1966     Years since quittin.1    Smokeless tobacco: Never    Tobacco comments:     smoked for maybe 5 years as a teenager..    Vaping Use    Vaping status: Never Used   Substance and Sexual Activity    Alcohol use: No    Drug use: No       Family History   Problem Relation Age of Onset    Diabetes Mother     Heart disease Mother     Heart disease Father     Diabetes Other         Diabetes    Heart disease Other     Stroke Other     Other Other         Siblings: CABG; Cardiac stenting       Review of Systems   Constitutional: Negative for malaise/fatigue and night sweats.   Cardiovascular:  Positive for claudication and dyspnea on exertion. Negative for chest pain, leg swelling, near-syncope, orthopnea, palpitations and syncope.   Respiratory:  Positive for shortness of breath. Negative for cough and wheezing.    Musculoskeletal:  Negative for back pain, joint pain and stiffness.   Gastrointestinal:  Negative for anorexia, heartburn, nausea and vomiting.   Genitourinary:  Negative for dysuria, hematuria, hesitancy and nocturia.   Neurological:  Positive for light-headedness. Negative for dizziness and loss of balance.   Psychiatric/Behavioral:  Negative for depression and memory loss. The patient is nervous/anxious.            Objective     /94 (BP Location: Left arm)   Pulse 59   Ht 154 cm  "(60.63\")   Wt 44 kg (97 lb)   BMI 18.55 kg/m²     Constitutional:       Appearance: Not in distress.   Eyes:      Pupils: Pupils are equal, round, and reactive to light.   HENT:      Nose: Nose normal.   Pulmonary:      Effort: Pulmonary effort is normal.      Breath sounds: Normal breath sounds.   Cardiovascular:      PMI at left midclavicular line. Bradycardia present. Regular rhythm.      Murmurs: There is a systolic murmur.   Abdominal:      Palpations: Abdomen is soft.   Musculoskeletal: Normal range of motion.      Cervical back: Normal range of motion and neck supple. Skin:     General: Skin is warm and dry.   Neurological:      Mental Status: Alert.           ECG 12 Lead    Date/Time: 1/16/2025 2:01 PM  Performed by: Roselyn Leo APRN    Authorized by: Roselyn Leo APRN  Comparison: compared with previous ECG from 9/30/2020  Comparison to previous ECG: No RBBB prior  Rhythm: sinus bradycardia  BPM: 59               Diagnoses and all orders for this visit:    1. Essential hypertension (Primary)  -     hydroCHLOROthiazide 12.5 MG tablet; Take 1 tablet by mouth Daily As Needed (daily as needed for HTN).  Dispense: 90 tablet; Refill: 2  -     Cancel: US Renal Artery Complete; Future    2. Non compliance w medication regimen    3. Chest pain, atypical  -     ECG 12 Lead  -     Stress Test With Myocardial Perfusion One Day; Future    4. Abnormal EKG  -     Stress Test With Myocardial Perfusion One Day; Future    5. RBBB  -     ECG 12 Lead  -     Stress Test With Myocardial Perfusion One Day; Future    6. Shortness of breath  -     Stress Test With Myocardial Perfusion One Day; Future    7. Uncontrolled hypertension    8. PVD (peripheral vascular disease) with claudication  -     US Arterial Doppler Lower Extremity Bilateral; Future    9. Tingling in extremities  -     US Arterial Doppler Lower Extremity Bilateral; Future    10. Anxiety    11. Grief    Other orders  -     aliskiren (TEKTURNA) 300 MG " tablet; Take 1 tablet by mouth Daily.  Dispense: 90 tablet; Refill: 2  -     ezetimibe (ZETIA) 10 MG tablet; Take 1 tablet by mouth Daily.  Dispense: 90 tablet; Refill: 2             HTN:  Blood pressure elevated, discussed once again importance of adding HCTZ daily vs prn use. Will continue tekturna at same. Limited sodium urged. Continue use of clonidine if BP not at goal after routine meds.     Anxiety: Using ativan prn. Encouraged to take as needed for anxiety control.     Murmur/SOA/pulm HTN:  Echo 2024 showed EF stable as well as valve areas, RVSP 62mmHG on echo, tried revatio for management, she could not tolerate. Referred to T[facility] Guerrero, she has not seen her. Thinks she has appt the end of the month. Urged to keep it.     Hyperlipidemia: She is taking her zetia daily. FLP with your office. She can not tolerate statin therapy. Can we have lab for review?     Hypothyroid/Palpitations:  Managed with synthroid therapy. TSH with your office.    Grief: Lost her  end of December. Continue use of ativan as needed. Condolences offered on her loss.    BMI stable at 18.55, continued cardiac diet with adequate protein and limited sodium advised.  Protein supplementation urged.      Refills sent     6 month follow up recommended or sooner if needed.                        Problems Addressed this Visit          Cardiac and Vasculature    Essential hypertension - Primary    Relevant Medications    aliskiren (TEKTURNA) 300 MG tablet    hydroCHLOROthiazide 12.5 MG tablet     Other Visit Diagnoses       Non compliance w medication regimen        Chest pain, atypical        Relevant Orders    ECG 12 Lead    Stress Test With Myocardial Perfusion One Day    Abnormal EKG        Relevant Orders    Stress Test With Myocardial Perfusion One Day    RBBB        Relevant Orders    ECG 12 Lead    Stress Test With Myocardial Perfusion One Day    Shortness of breath        Relevant Orders    Stress Test With Myocardial  Perfusion One Day    Uncontrolled hypertension        Relevant Medications    aliskiren (TEKTURNA) 300 MG tablet    hydroCHLOROthiazide 12.5 MG tablet    PVD (peripheral vascular disease) with claudication        Relevant Orders    US Arterial Doppler Lower Extremity Bilateral    Tingling in extremities        Relevant Orders    US Arterial Doppler Lower Extremity Bilateral    Anxiety        Grief              Diagnoses         Codes Comments    Essential hypertension    -  Primary ICD-10-CM: I10  ICD-9-CM: 401.9     Non compliance w medication regimen     ICD-10-CM: Z91.148  ICD-9-CM: V15.81     Chest pain, atypical     ICD-10-CM: R07.89  ICD-9-CM: 786.59     Abnormal EKG     ICD-10-CM: R94.31  ICD-9-CM: 794.31     RBBB     ICD-10-CM: I45.10  ICD-9-CM: 426.4     Shortness of breath     ICD-10-CM: R06.02  ICD-9-CM: 786.05     Uncontrolled hypertension     ICD-10-CM: I10  ICD-9-CM: 401.9     PVD (peripheral vascular disease) with claudication     ICD-10-CM: I73.9  ICD-9-CM: 443.9     Tingling in extremities     ICD-10-CM: R20.2  ICD-9-CM: 782.0     Anxiety     ICD-10-CM: F41.9  ICD-9-CM: 300.00     Grief     ICD-10-CM: F43.21  ICD-9-CM: 309.0             BMI is within normal parameters. No other follow-up for BMI required.              Electronically signed by Roselyn Leo, APRN January 16, 2025 17:25 EST

## 2025-07-24 ENCOUNTER — OFFICE VISIT (OUTPATIENT)
Dept: CARDIOLOGY | Facility: CLINIC | Age: 80
End: 2025-07-24
Payer: MEDICARE

## 2025-07-24 VITALS
HEART RATE: 72 BPM | DIASTOLIC BLOOD PRESSURE: 60 MMHG | SYSTOLIC BLOOD PRESSURE: 160 MMHG | WEIGHT: 99 LBS | HEIGHT: 61 IN | BODY MASS INDEX: 18.69 KG/M2

## 2025-07-24 DIAGNOSIS — Z91.148 NON COMPLIANCE W MEDICATION REGIMEN: ICD-10-CM

## 2025-07-24 DIAGNOSIS — Z86.73 OLD CEREBROVASCULAR ACCIDENT (CVA) WITHOUT LATE EFFECT: ICD-10-CM

## 2025-07-24 DIAGNOSIS — I10 ESSENTIAL HYPERTENSION: Primary | ICD-10-CM

## 2025-07-24 DIAGNOSIS — R01.1 MURMUR, CARDIAC: ICD-10-CM

## 2025-07-24 DIAGNOSIS — F41.9 ANXIETY: ICD-10-CM

## 2025-07-24 DIAGNOSIS — I27.20 PULMONARY HYPERTENSION: ICD-10-CM

## 2025-07-24 DIAGNOSIS — R00.2 PALPITATIONS: ICD-10-CM

## 2025-07-24 PROCEDURE — 99214 OFFICE O/P EST MOD 30 MIN: CPT | Performed by: NURSE PRACTITIONER

## 2025-07-24 PROCEDURE — 3078F DIAST BP <80 MM HG: CPT | Performed by: NURSE PRACTITIONER

## 2025-07-24 PROCEDURE — 3077F SYST BP >= 140 MM HG: CPT | Performed by: NURSE PRACTITIONER

## 2025-07-24 RX ORDER — CLONIDINE HYDROCHLORIDE 0.1 MG/1
TABLET ORAL
Qty: 180 TABLET | Refills: 2 | Status: SHIPPED | OUTPATIENT
Start: 2025-07-24

## 2025-07-24 RX ORDER — HYDROCHLOROTHIAZIDE 12.5 MG/1
12.5 TABLET ORAL DAILY
Qty: 90 TABLET | Refills: 2 | Status: SHIPPED | OUTPATIENT
Start: 2025-07-24

## 2025-07-24 RX ORDER — LEVOTHYROXINE SODIUM 88 UG/1
88 TABLET ORAL
COMMUNITY

## 2025-07-24 NOTE — PROGRESS NOTES
"Chief Complaint   Patient presents with    Follow-up     For cardiac management. Patient is not on aspirin. Last lab work unknown, does see PCP next week. States that she did have a fall a few months ago and fractured her wrist and hit her head, did not have any bleeding on the brain scan, but did show an old stroke. States she had sharp pains in her chest, but states that she did have back pain, but thinks it was shingles. States that she occasionally has SOA. States that she occasionally has palpitations. States that she feels more \"wobbly\" since her fall.     Med Refill     Needs refills on cardiac medications. 90 day supplies to [facility]. Went over medications verbally.     Macular degeneration     Patient is now having to get injections in her eye every 2 months for macular degeneration.       Cardiac Complaints  palpitations      Subjective   Maryellen Fernando is a 80 y.o. female with significant HTN, pulm HTN, hypothyroidism, anxiety, mitral regurgitation, and hyperlipidemia whose cardiac workup showed normal coronaries and moderate renal artery stenosis. Most recent echo in 2016 showed normal LV function and mod MR.  She is allergic to almost every medication except Tekturna.  She also takes low dose of HCTZ as needed for elevated bp. She is quite anxious and patient reports ativan brings her blood pressure down as well. Echo repeated April 2022 which showed normal EF and MR and AI remained mild. PA pressure elevated, pulmonary referral advised.Echo repeated in October 2022 showed RVSP at 69mmHg, revatio started, but she could not tolerate due to side effects. She was urged to use ativan by PCP before last visit as her BP was increasing. Last visit, HCTZ encouraged daily for better BP control. Echo 2024 showed RVSP 62mmHg, similar to prior, referral to [facility] made. Arterial duplex of BLE negative 1/2025.    She admits to a fall a few months ago and she reports she feel and hit her head. She " states she had a CT of the head that was negative for bleed, but did show old CVA. She does admit to some sharp pain in her back some time ago, but thinks it was from shingles at the time, this has resolved. SOA and palpitations are unchanged. Labs next week with PCP. Getting eye injections for macular degeneration. She will see [facility] Guerrero for pulmonary HTN next week.            Cardiac History  Past Surgical History:   Procedure Laterality Date    CARDIAC CATHETERIZATION  03/20/2014    Cath-Normal Coronaries. (L) renal artery-60%.    CARDIOVASCULAR STRESS TEST  02/20/2014    Stress-(Mod) 9Min, 71%THR. R/O Inferior Ischemia.    ECHO - CONVERTED  02/20/2014    Echo-EF 65%. Inferior WMA.    ECHO - CONVERTED  12/20/2016    EF 60%, mild to mod MR, diastolic dysfunction    ECHO - CONVERTED  07/15/2019    @ [facility]. EF 65%. Mild MR & AI. RVSP- 35 mmHg    ECHO - CONVERTED  04/13/2022    EF 60%. Mild MR. RVSP- 63 mmHg    ECHO - CONVERTED  10/10/2022    EF 60%,  Mild MR. RVSP- 69 mmHg    ECHO - CONVERTED  07/16/2024    EF 60%. Trace-Mild AI. Mild-Mod MR. RVSP- 62 mmHg    HYSTERECTOMY      US ARTERIAL DOPPLER LOWER EXTREMITY  UNILATERAL Bilateral 01/2025    neg for arterial stenosis       Current Outpatient Medications   Medication Sig Dispense Refill    aliskiren (TEKTURNA) 300 MG tablet Take 1 tablet by mouth Daily. 90 tablet 2    cloNIDine (Catapres) 0.1 MG tablet Take twice a day as needed for BP management after meds, SBP >160 DBP>90 180 tablet 2    Cyanocobalamin (VITAMIN B-12 IJ) Inject  as directed Every 30 (Thirty) Days.      ezetimibe (ZETIA) 10 MG tablet Take 1 tablet by mouth Daily. 90 tablet 2    hydroCHLOROthiazide 12.5 MG tablet Take 1 tablet by mouth Daily. 90 tablet 2    levothyroxine (SYNTHROID, LEVOTHROID) 88 MCG tablet Take 1 tablet by mouth Every Morning.      loratadine (CLARITIN) 10 MG tablet Take 1 tablet by mouth Daily As Needed for Allergies.      LORazepam (ATIVAN) 0.5 MG tablet Take 1 tablet by  mouth Every 8 (Eight) Hours As Needed.       No current facility-administered medications for this visit.       Albuterol, Edarbyclor [azilsartan-chlorthalidone], Flagyl [metronidazole], Lopid [gemfibrozil], Minoxidil, Sulfa antibiotics, Avapro [irbesartan], Beconase [beclomethasone], Benicar [olmesartan], Buspar [buspirone], Clarithromycin, Clonidine derivatives, Coreg [carvedilol], Cozaar [losartan potassium], Diltiazem, Donnatal [phenobarbital-belladonna alk], Penicillins, Toprol xl [metoprolol tartrate], and Zestril [lisinopril]    Past Medical History:   Diagnosis Date    Anxiety     Anxiety     Esophageal reflux     H/O: hysterectomy     History of colonoscopy 2021    History of COVID-19     History of esophagogastroduodenoscopy (EGD) 2021    Hypercholesterolemia     Hypertension     Hypothyroidism     Osteoporosis     Scoliosis     TB (tuberculosis)     Treated a year ago for TB       Social History     Socioeconomic History    Marital status:    Tobacco Use    Smoking status: Former     Current packs/day: 0.00     Types: Cigarettes     Quit date: 1966     Years since quittin.6    Smokeless tobacco: Never    Tobacco comments:     smoked for maybe 5 years as a teenager..    Vaping Use    Vaping status: Never Used   Substance and Sexual Activity    Alcohol use: No    Drug use: No       Family History   Problem Relation Age of Onset    Diabetes Mother     Heart disease Mother     Heart disease Father     Diabetes Other         Diabetes    Heart disease Other     Stroke Other     Other Other         Siblings: CABG; Cardiac stenting       Review of Systems   Constitutional: Negative for malaise/fatigue and night sweats.   Cardiovascular:  Positive for palpitations. Negative for chest pain, claudication, dyspnea on exertion, irregular heartbeat, leg swelling, near-syncope, orthopnea and syncope.   Respiratory:  Negative for cough, shortness of breath and wheezing.    Musculoskeletal:   "Positive for stiffness. Negative for back pain and joint pain.   Gastrointestinal:  Negative for anorexia, heartburn, nausea and vomiting.   Genitourinary:  Negative for dysuria, hematuria, hesitancy and nocturia.   Neurological:  Negative for dizziness, headaches and light-headedness.   Psychiatric/Behavioral:  Negative for depression and memory loss. The patient is nervous/anxious.            Objective     /60 (BP Location: Right arm)   Pulse 72   Ht 154 cm (60.63\")   Wt 44.9 kg (99 lb)   BMI 18.93 kg/m²     Constitutional:       Appearance: Not in distress.   Eyes:      Pupils: Pupils are equal, round, and reactive to light.   HENT:      Nose: Nose normal.   Pulmonary:      Effort: Pulmonary effort is normal.      Breath sounds: Normal breath sounds.   Cardiovascular:      PMI at left midclavicular line. Normal rate. Regular rhythm.      Murmurs: There is a systolic murmur.   Musculoskeletal: Normal range of motion.      Cervical back: Normal range of motion and neck supple. Skin:     General: Skin is warm and dry.   Neurological:      Mental Status: Alert.         Procedures         Diagnoses and all orders for this visit:    1. Essential hypertension (Primary)  -     hydroCHLOROthiazide 12.5 MG tablet; Take 1 tablet by mouth Daily.  Dispense: 90 tablet; Refill: 2    2. Palpitations    3. Pulmonary hypertension    4. Non compliance w medication regimen    5. Murmur, cardiac    6. Old cerebrovascular accident (CVA) without late effect    7. Anxiety    Other orders  -     cloNIDine (Catapres) 0.1 MG tablet; Take twice a day as needed for BP management after meds, SBP >160 DBP>90  Dispense: 180 tablet; Refill: 2             HTN?non compliance:  Blood pressure elevated, discussed once again importance HCTZ daily, she has not been taking this way. Will continue tekturna at same. She does report CVA (old), importance of BP management urged. Limited sodium urged. Continue use of clonidine if BP not at goal " after routine meds.     Anxiety: Using ativan prn. Encouraged to take as needed for anxiety control.     Murmur/SOA/pulm HTN:  Echo 2024 showed EF stable as well as valve areas, RVSP 62mmHG on echo, tried revatio for management, she could not tolerate. Referred to [facility] Guerrero, she sees her next week.     Hyperlipidemia: She is taking her zetia daily. FLP with your office to be checked soon. She can not tolerate statin therapy. Can we have lab for review?     Hypothyroid/Palpitations:  Managed with synthroid therapy. TSH with your office. Palpitations only rarely. Will have rechecked with your office.     Macular degeneration: Followed by specialty, is getting injections    BMI stable at 18.93, continued cardiac diet with adequate protein and limited sodium advised.  Protein supplementation urged.      Refills sent     6 month follow up recommended or sooner if needed.          Problems Addressed this Visit          Cardiac and Vasculature    Essential hypertension - Primary    Relevant Medications    hydroCHLOROthiazide 12.5 MG tablet    cloNIDine (Catapres) 0.1 MG tablet    Murmur, cardiac       Pulmonary and Pneumonias    Pulmonary hypertension     Other Visit Diagnoses         Palpitations          Non compliance w medication regimen          Old cerebrovascular accident (CVA) without late effect          Anxiety              Diagnoses         Codes Comments      Essential hypertension    -  Primary ICD-10-CM: I10  ICD-9-CM: 401.9       Palpitations     ICD-10-CM: R00.2  ICD-9-CM: 785.1       Pulmonary hypertension     ICD-10-CM: I27.20  ICD-9-CM: 416.8       Non compliance w medication regimen     ICD-10-CM: Z91.148  ICD-9-CM: V15.81       Murmur, cardiac     ICD-10-CM: R01.1  ICD-9-CM: 785.2       Old cerebrovascular accident (CVA) without late effect     ICD-10-CM: Z86.73  ICD-9-CM: V12.54       Anxiety     ICD-10-CM: F41.9  ICD-9-CM: 300.00                          Electronically signed by Roselyn REEDER  Felipa, EMIL July 24, 2025 13:38 EDT

## 2025-07-31 ENCOUNTER — OFFICE VISIT (OUTPATIENT)
Dept: PULMONOLOGY | Facility: CLINIC | Age: 80
End: 2025-07-31
Payer: MEDICARE

## 2025-07-31 DIAGNOSIS — I27.20 PULMONARY HYPERTENSION: Primary | ICD-10-CM

## 2025-07-31 DIAGNOSIS — J30.9 ALLERGIC RHINITIS, UNSPECIFIED SEASONALITY, UNSPECIFIED TRIGGER: ICD-10-CM

## 2025-07-31 RX ORDER — FLUTICASONE PROPIONATE 50 MCG
2 SPRAY, SUSPENSION (ML) NASAL DAILY
COMMUNITY

## 2025-07-31 RX ORDER — FAMOTIDINE 20 MG/1
20 TABLET, FILM COATED ORAL 2 TIMES DAILY
COMMUNITY

## 2025-07-31 RX ORDER — AZITHROMYCIN 250 MG/1
TABLET, FILM COATED ORAL
COMMUNITY

## 2025-07-31 NOTE — PROGRESS NOTES
"     New Pulmonary Patient Office Visit      Patient Name: Maryellen Fernando    Referring Physician: Roselyn Leo APRN    Chief Complaint:    Chief Complaint   Patient presents with    Pulmonary hypertension    Consult       History of Present Illness: Maryellen Fernando is a 80 y.o. female who is referred here today by cardiology to establish care with Pulmonary for pulmonary hypertension.  Notes from referring provider indicate that she was tried on Revatio in the past, though could not tolerate use of the medication. She says that she thinks she is doing well for her age but that she most struggles with control of her HTN and anxiety. She is intolerant to many medications. She says that Tektuna, HCTZ, and PRN clonidine are the only BP medications that she has been able to tolerate and that she previously had \"reactions\" to beta blockers and calcium channel blockers.    She already follows with pulmonary, Dr. Anguiano. She denies any known hx of obstructive lung disease/asthma. She says she gets \"short winded a little bit\" on occasion, which she relates to her age and seasonal allergies, though she notes that she also has known scoliosis and a hiatal hernia, and also finds that this is apparent when she is feeling stressed or when her blood pressure is elevated. She denies use of any inhalers as she did not tolerate use of albuterol in the past. She notes that Pfts were attempted at Dr. Anguiano's office previously, though she \"panicked\" and was unable to complete the test. She says that she was on allergy shots in the past which were beneficial. She reports \"scarring\" in her lungs which has been apparent for a long time and has been unchanged to her knowledge. She denies any s/s suggested on ISELA and she doesn't think that she would tolerate use of PAP is needed. She denies any known conditions such as RA or Lupus. No frequent episodes of bronchitis/URIs reported. 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  Supplemental Oxygen: No  Cardiac History: Murmur, HTN, HLD  Last steroids: Will not use as she says that she \"can't breathe\" when she takes steroids.    Subjective      Review of Systems:   Review of Systems   Constitutional:  Negative for fatigue, fever and unexpected weight change.   Respiratory:  Positive for cough and shortness of breath. Negative for wheezing.    Cardiovascular:  Negative for chest pain and leg swelling.   Musculoskeletal:  Negative for arthralgias.   Allergic/Immunologic: Positive for environmental allergies.   Psychiatric/Behavioral:  Negative for sleep disturbance. The patient is nervous/anxious.         Past Medical History:   Past Medical History:   Diagnosis Date    Anxiety     Anxiety     Esophageal reflux     H/O: hysterectomy     History of colonoscopy 12/2021    History of COVID-19     History of esophagogastroduodenoscopy (EGD) 12/2021    Hypercholesterolemia     Hypertension     Hypothyroidism     Osteoporosis     Scoliosis     TB (tuberculosis)     Treated a year ago for TB       Past Surgical History:   Past Surgical History:   Procedure Laterality Date    CARDIAC CATHETERIZATION  03/20/2014    Cath-Normal Coronaries. (L) renal artery-60%.    CARDIOVASCULAR STRESS TEST  02/20/2014    Stress-(Mod) 9Min, 71%THR. R/O Inferior Ischemia.    ECHO - CONVERTED  02/20/2014    Echo-EF 65%. Inferior WMA.    ECHO - CONVERTED  12/20/2016    EF 60%, mild to mod MR, diastolic dysfunction    ECHO - CONVERTED  07/15/2019    @ [facility]. EF 65%. Mild MR & AI. RVSP- 35 mmHg    ECHO - CONVERTED  04/13/2022    EF 60%. Mild MR. RVSP- 63 mmHg    ECHO - " CONVERTED  10/10/2022    EF 60%,  Mild MR. RVSP- 69 mmHg    ECHO - CONVERTED  2024    EF 60%. Trace-Mild AI. Mild-Mod MR. RVSP- 62 mmHg    HYSTERECTOMY      US ARTERIAL DOPPLER LOWER EXTREMITY  UNILATERAL Bilateral 2025    neg for arterial stenosis       Family History:   Family History   Problem Relation Age of Onset    Diabetes Mother     Heart disease Mother     Heart disease Father     Diabetes Other         Diabetes    Heart disease Other     Stroke Other     Other Other         Siblings: CABG; Cardiac stenting       Social History:   Social History     Socioeconomic History    Marital status:    Tobacco Use    Smoking status: Former     Current packs/day: 0.00     Types: Cigarettes     Quit date: 1966     Years since quittin.6    Smokeless tobacco: Never    Tobacco comments:     smoked for maybe 5 years as a teenager..    Vaping Use    Vaping status: Never Used   Substance and Sexual Activity    Alcohol use: No    Drug use: No       Medications:     Current Outpatient Medications:     aliskiren (TEKTURNA) 300 MG tablet, Take 1 tablet by mouth Daily., Disp: 90 tablet, Rfl: 2    azithromycin (ZITHROMAX) 250 MG tablet, TAKE 2 TABLETS BY MOUTH ON DAY 1, THEN TAKE 1 TABLET DAILY ON DAYS 2-5, Disp: , Rfl:     cloNIDine (Catapres) 0.1 MG tablet, Take twice a day as needed for BP management after meds, SBP >160 DBP>90, Disp: 180 tablet, Rfl: 2    Cyanocobalamin (VITAMIN B-12 IJ), Inject  as directed Every 30 (Thirty) Days., Disp: , Rfl:     ezetimibe (ZETIA) 10 MG tablet, Take 1 tablet by mouth Daily., Disp: 90 tablet, Rfl: 2    famotidine (PEPCID) 20 MG tablet, Take 1 tablet by mouth 2 (Two) Times a Day., Disp: , Rfl:     fluticasone (FLONASE) 50 MCG/ACT nasal spray, 2 sprays by Each Nare route Daily., Disp: , Rfl:     hydroCHLOROthiazide 12.5 MG tablet, Take 1 tablet by mouth Daily., Disp: 90 tablet, Rfl: 2    levothyroxine (SYNTHROID, LEVOTHROID) 88 MCG tablet, Take 75 mcg by mouth Every  "Morning., Disp: , Rfl:     loratadine (CLARITIN) 10 MG tablet, Take 1 tablet by mouth Daily As Needed for Allergies., Disp: , Rfl:     LORazepam (ATIVAN) 0.5 MG tablet, Take 1 tablet by mouth Every 8 (Eight) Hours As Needed., Disp: , Rfl:     Allergies:   Allergies   Allergen Reactions    Albuterol Other (See Comments)     Heart racing and difficulty breathing    Edarbyclor [Azilsartan-Chlorthalidone] Nausea Only     severe weakness    Flagyl [Metronidazole] Hives    Lopid [Gemfibrozil] GI Intolerance    Minoxidil Nausea Only     Severe     Sulfa Antibiotics Hives    Avapro [Irbesartan] Other (See Comments)     Unable to recall reaction    Beconase [Beclomethasone] Other (See Comments)     Unable to recall reaction    Benicar [Olmesartan] Other (See Comments)     Unable to recall reaction    Buspar [Buspirone] Other (See Comments)     Unable to recall reaction    Clarithromycin Rash    Clonidine Derivatives Other (See Comments)     Numbness     Coreg [Carvedilol] Other (See Comments)     Unable to recall reaction    Cozaar [Losartan Potassium] Other (See Comments)     Unable to recall reaction    Diltiazem Other (See Comments)     Unable to recall reaction    Donnatal [Phenobarbital-Belladonna Alk] Palpitations    Penicillins Rash    Toprol Xl [Metoprolol Tartrate] Other (See Comments)     Unable to recall reaction    Zestril [Lisinopril] Cough       Objective     Physical Exam:  Vital Signs:   Vitals:    07/31/25 1304   BP: 158/90   Pulse: 63   Resp: 18   SpO2: 99%   Weight: 44.9 kg (99 lb)   Height: 153.9 cm (60.6\")     Body mass index is 18.95 kg/m².    Physical Exam  Vitals reviewed.   Constitutional:       General: She is not in acute distress.     Appearance: She is not toxic-appearing.   HENT:      Head: Normocephalic and atraumatic.      Mouth/Throat:      Mouth: Mucous membranes are moist.   Eyes:      Extraocular Movements: Extraocular movements intact.      Conjunctiva/sclera: Conjunctivae normal. "   Cardiovascular:      Rate and Rhythm: Normal rate.      Heart sounds: Normal heart sounds.   Pulmonary:      Effort: Pulmonary effort is normal.      Breath sounds: Normal breath sounds.   Abdominal:      General: There is no distension.      Palpations: Abdomen is soft.   Musculoskeletal:         General: No swelling.      Cervical back: Neck supple.   Skin:     General: Skin is warm and dry.      Findings: No rash.   Neurological:      General: No focal deficit present.      Mental Status: She is alert and oriented to person, place, and time.   Psychiatric:         Mood and Affect: Mood normal.         Behavior: Behavior normal.       Results Review:   March 2021 CT abdomen and pelvis showed chronic changes involving the lung bases.    March 2021 chest x-ray showed no acute cardiopulmonary abnormality.  Lungs are well-expanded and clear.    Results for orders placed during the hospital encounter of 07/16/24    Adult Transthoracic Echo Complete W/ Cont if Necessary Per Protocol    Interpretation Summary    Sinus bradycardia was the predominant rhythm observed during the procedure.    Left ventricular wall thickness is consistent with borderline concentric hypertrophy.    Left ventricular ejection fraction appears to be 56 - 60%.    Left ventricular diastolic function is consistent with (grade I) impaired relaxation and age.    Trace to mild aortic valve regurgitation is present.    Mild to moderate mitral valve regurgitation is present    Severe tricuspid valve regurgitation is present.  RVSP - 62 mmHg    Assessment / Plan      Assessment/Plan:    Diagnoses and all orders for this visit:    1. Pulmonary hypertension (Primary)  We had a long discussion about this today. She does not appear to be overtly symptomatic. No lifestyle limiting dyspnea and no clinical evidence of decompensated CHF on exam. Prior echocardiogram results were reviewed and discussed with patient.  RVSP has been in the 60s dating back to her  March 2022 echocardiogram. RV size remains normal according to the reports.  Suspect group 2 +/- group 3 PH and advise treating underlying conditions, particularly HTN and any accompanying HFpEF, as optimally as possible. Unclear if she has any contributing pulmonary conditions such as obstructive lung disease or ILD though she declines to perform PFTs due to anxiety and has not tolerated use of inhaled medications. She is oxygenating well today on room air and continues to follow with pulmonary, Dr. Anguiano. No s/s to suggest ISELA though we discussed that untreated ISELA can cause PH. She does not want to pursue a sleep study as she would not consider PAP use. I advised her that the gold standard for PH evaluation is a RHC to confirm the diagnosis and guide any PH-specific therapy options. She declines to consider an invasive evaluation, however. Given her hx of medication intolerances, it is unlikely that she would tolerate any potential treatment options if deemed necessary per RHC anyhow. Should she decide to pursue RHC in the future, she was advised that I would recommend referral to the PH clinic at .    2. Allergic rhinitis, unspecified seasonality, unspecified trigger  Current controlled on Flonase and oral antihistamine.       I spent 45 minutes caring for Maryellen on this date of service. This time includes time spent by me in the following activities: preparing for the visit, reviewing tests, obtaining and/or reviewing a separately obtained history, performing a medically appropriate examination and/or evaluation, counseling and educating the patient/family/caregiver, documenting information in the medical record, independently interpreting results and communicating that information with the patient/family/caregiver, and care coordination.       The patient was counseled on diagnostic results, risks and benefits of treatment options, risk factor modifications and the importance of treatment compliance. The  patient was advised to contact the clinic with concerns or worsening symptoms.     EMIL Garcia  Pulmonary Medicine [place]    This document has been electronically signed by EMIL Garcia  July 31, 2025

## 2025-08-01 VITALS
WEIGHT: 99 LBS | SYSTOLIC BLOOD PRESSURE: 158 MMHG | OXYGEN SATURATION: 99 % | BODY MASS INDEX: 18.69 KG/M2 | HEART RATE: 63 BPM | RESPIRATION RATE: 18 BRPM | HEIGHT: 61 IN | DIASTOLIC BLOOD PRESSURE: 90 MMHG